# Patient Record
Sex: MALE | Race: BLACK OR AFRICAN AMERICAN | Employment: FULL TIME | URBAN - METROPOLITAN AREA
[De-identification: names, ages, dates, MRNs, and addresses within clinical notes are randomized per-mention and may not be internally consistent; named-entity substitution may affect disease eponyms.]

---

## 2017-10-04 ENCOUNTER — HOSPITAL ENCOUNTER (EMERGENCY)
Age: 27
Discharge: HOME | End: 2017-10-04

## 2017-10-04 VITALS
HEIGHT: 68 IN | BODY MASS INDEX: 21.22 KG/M2 | OXYGEN SATURATION: 97 % | SYSTOLIC BLOOD PRESSURE: 121 MMHG | HEART RATE: 61 BPM | TEMPERATURE: 98.1 F | WEIGHT: 140 LBS | DIASTOLIC BLOOD PRESSURE: 73 MMHG | RESPIRATION RATE: 18 BRPM

## 2017-10-04 DIAGNOSIS — L30.9 DERMATITIS: Primary | ICD-10-CM

## 2017-10-04 PROCEDURE — 99282 EMERGENCY DEPT VISIT SF MDM: CPT

## 2017-10-04 RX ORDER — DIAPER,BRIEF,INFANT-TODD,DISP
EACH MISCELLANEOUS
Qty: 1 TUBE | Refills: 0 | Status: SHIPPED | OUTPATIENT
Start: 2017-10-04 | End: 2017-10-11

## 2017-10-04 RX ORDER — MUPIROCIN CALCIUM 20 MG/G
CREAM TOPICAL
Qty: 1 TUBE | Refills: 0 | Status: SHIPPED | OUTPATIENT
Start: 2017-10-04 | End: 2017-11-03

## 2017-10-04 ASSESSMENT — ENCOUNTER SYMPTOMS
EYE PAIN: 0
EYE DISCHARGE: 0
ABDOMINAL PAIN: 0
APNEA: 0
VOMITING: 0
BACK PAIN: 0
ANAL BLEEDING: 0
VOICE CHANGE: 0
NAUSEA: 0
SHORTNESS OF BREATH: 0
PHOTOPHOBIA: 0
COUGH: 0
ABDOMINAL DISTENTION: 0

## 2017-10-04 ASSESSMENT — PAIN SCALES - GENERAL: PAINLEVEL_OUTOF10: 6

## 2017-10-04 ASSESSMENT — PAIN DESCRIPTION - LOCATION: LOCATION: GROIN

## 2017-10-04 ASSESSMENT — PAIN DESCRIPTION - ORIENTATION: ORIENTATION: RIGHT;LEFT

## 2017-10-04 ASSESSMENT — PAIN DESCRIPTION - PAIN TYPE: TYPE: ACUTE PAIN

## 2017-10-04 NOTE — ED NOTES
Discharge instructions reviewed with patient who verbalizes his understanding. Ambulated to [facility].       Valentin Longoria RN  10/04/17 [id number]

## 2017-10-04 NOTE — ED PROVIDER NOTES
easily. Psychiatric/Behavioral: Negative for behavioral problems, self-injury and sleep disturbance. All other systems reviewed and are negative. Except as noted above the remainder of the review of systems was reviewed and negative. PAST MEDICAL HISTORY     Past Medical History:   Diagnosis Date    Asthma     as a child         SURGICAL HISTORY       Past Surgical History:   Procedure Laterality Date    LYMPH NODE DISSECTION           CURRENT MEDICATIONS       Previous Medications    No medications on file       ALLERGIES     Review of patient's allergies indicates no known allergies. FAMILY HISTORY     History reviewed. No pertinent family history. SOCIAL HISTORY       Social History     Social History    Marital status: Single     Spouse name: N/A    Number of children: N/A    Years of education: N/A     Social History Main Topics    Smoking status: Current Every Day Smoker     Types: Cigars    Smokeless tobacco: None    Alcohol use No    Drug use: No    Sexual activity: Not Asked     Other Topics Concern    None     Social History Narrative    None       SCREENINGS             PHYSICAL EXAM    (up to 7 for level 4, 8 or more for level 5)   ED Triage Vitals   BP Temp Temp Source Pulse Resp SpO2 Height Weight   10/04/17 0333 10/04/17 0333 10/04/17 0333 10/04/17 0333 10/04/17 0333 10/04/17 0333 10/04/17 0333 10/04/17 0333   121/73 98.1 °F (36.7 °C) Oral 61 18 97 % 5' 8\" (1.727 m) 140 lb (63.5 kg)       Physical Exam   Constitutional: He is oriented to person, place, and time. He appears well-developed and well-nourished. No distress. HENT:   Head: Normocephalic and atraumatic. Mouth/Throat: Oropharynx is clear and moist. No oropharyngeal exudate. Eyes: Conjunctivae and EOM are normal. Pupils are equal, round, and reactive to light. Right eye exhibits no discharge. Left eye exhibits no discharge. Neck: Normal range of motion. Neck supple.    Cardiovascular: Normal rate, regular rhythm, normal heart sounds and intact distal pulses. Pulmonary/Chest: Effort normal and breath sounds normal. No stridor. No respiratory distress. He has no wheezes. He has no rales. Abdominal: Soft. Bowel sounds are normal. He exhibits no distension. There is no tenderness. There is no rebound. Musculoskeletal: Normal range of motion. Neurological: He is alert and oriented to person, place, and time. Skin: Skin is warm. Rash noted. No erythema. Few round lesions noted left groin patient. Neg abscess, neg erythema. Psychiatric: He has a normal mood and affect. His behavior is normal.   Nursing note and vitals reviewed. DIAGNOSTIC RESULTS     EKG: All EKG's are interpreted by the Emergency Department Physician who either signs or Co-signs this chart in the absence of a cardiologist.         RADIOLOGY:   Non-plain film images such as CT, Ultrasound and MRI are read by the radiologist. Plain radiographic images are visualized and preliminarily interpreted by the emergency physician with the below findings:         Interpretation per the Radiologist below, if available at the time of this note:    No orders to display         ED BEDSIDE ULTRASOUND:   Performed by ED Physician - none    LABS:  Labs Reviewed - No data to display    All other labs were within normal range or not returned as of this dictation. EMERGENCY DEPARTMENT COURSE and DIFFERENTIAL DIAGNOSIS/MDM:   Vitals:    Vitals:    10/04/17 0333   BP: 121/73   Pulse: 61   Resp: 18   Temp: 98.1 °F (36.7 °C)   TempSrc: Oral   SpO2: 97%   Weight: 140 lb (63.5 kg)   Height: 5' 8\" (1.727 m)            MDM  Number of Diagnoses or Management Options  Diagnosis management comments: Patient notes has had more lesions I past and they have drained yellow/cloudy fluid.     Patient notes lesions have improved recently he only has a few on his groin he notes they have been up and down both of his inner thighs as well we discussed follow-up with

## 2017-10-04 NOTE — ED AVS SNAPSHOT
[address] [id number]  [phone]      Preventive Care        Date Due    Yearly Flu Vaccine (1) 9/1/2017                 Care Plan Once You Return Home    This section includes instructions you will need to follow once you leave the hospital.  Your care team will discuss these with you, so you and those caring for you know how to best care for your health needs at home. This section may also include educational information about certain health topics that may be of help to you. Important Information if you smoke or are exposed to smoking       SMOKING: QUIT SMOKING. THIS IS THE MOST IMPORTANT ACTION YOU CAN TAKE TO IMPROVE YOUR CURRENT AND FUTURE HEALTH. Call the [address] [place] [address] at [address] NOW ([phone])    Smoking harms nonsmokers. When nonsmokers are around people who smoke, they absorb nicotine, carbon monoxide, and other ingredients of tobacco smoke. DO NOT SMOKE AROUND CHILDREN     Children exposed to secondhand smoke are at an increased risk of:  Sudden Infant Death Syndrome (SIDS), acute respiratory infections, inflammation of the middle ear, and severe asthma. Over a longer time, it causes heart disease and lung cancer. There is no safe level of exposure to secondhand smoke. Important information for a smoker       SMOKING: QUIT SMOKING. THIS IS THE MOST IMPORTANT ACTION YOU CAN TAKE TO IMPROVE YOUR CURRENT AND FUTURE HEALTH. Call the [address] [place] [address] at [address] ([phone])    Smoking harms nonsmokers. When nonsmokers are around people who smoke, they absorb nicotine, carbon monoxide, and other ingredients of tobacco smoke. DO NOT SMOKE AROUND CHILDREN     Children exposed to secondhand smoke are at an increased risk of:  Sudden Infant Death Syndrome (SIDS), acute respiratory infections, inflammation of the middle ear, and severe asthma.   Over a longer time, it causes heart disease and lung cancer. There is no safe level of exposure to secondhand smoke. MyChart Signup     [employer] allows you to send messages to your doctor, view your test results, renew your prescriptions, schedule appointments, view visit notes, and more. How Do I Sign Up? 1. In your Internet browser, go to [email or web address]  2. Click on the Sign Up Now link in the Sign In box. You will see the New Member Sign Up page. 3. Enter your [employer] Access Code exactly as it appears below. You will not need to use this code after youve completed the sign-up process. If you do not sign up before the expiration date, you must request a new code. [employer] Access Code: [email or web address]  Expires: 12/3/2017  4:04 AM    4. Enter your Social Security Number ([id number]) and Date of Birth (mm/dd/yyyy) as indicated and click Submit. You will be taken to the next sign-up page. 5. Create a [employer] ID. This will be your [employer] login ID and cannot be changed, so think of one that is secure and easy to remember. 6. Create a [employer] password. You can change your password at any time. 7. Enter your Password Reset Question and Answer. This can be used at a later time if you forget your password. 8. Enter your e-mail address. You will receive e-mail notification when new information is available in [address]. 9. Click Sign Up. You can now view your medical record. Additional Information  If you have questions, please contact the physician practice where you receive care. Remember, [employer] is NOT to be used for urgent needs. For medical emergencies, dial 911. For questions regarding your [employer] account call [phone]. If you have a clinical question, please call your doctor's office. View your information online  ? Review your current list of  medications, immunization, and allergies. ? Review your future test results online . ? Review your discharge instructions provided by your caregivers at discharge    Certain functionality such as prescription refills, scheduling appointments or sending messages to your provider are not activated if your provider does not use [employer] in his/her office    For questions regarding your MyChart account call [phone]. If you have a clinical question, please call your doctor's office. The information on all pages of the After Visit Summary has been reviewed with me, the patient and/or responsible adult, by my health care provider(s). I had the opportunity to ask questions regarding this information. I understand I should dispose of my armband safely at home to protect my health information. A complete copy of the After Visit Summary has been given to me, the patient and/or responsible adult. Patient Signature/Responsible Adult: ___________________________________    Nurse Signature: ___________________________________  Resident/MLP Signature: ___________________________________  Attending Signature: ___________________________________    Date:____________Time:____________              Discharge Instructions            Dermatitis: Care Instructions  Your Care Instructions  Dermatitis is the general name used for any rash or inflammation of the skin. Different kinds of dermatitis cause different kinds of rashes. Common causes of a rash include new medicines, plants (such as poison oak or poison ivy), heat, and stress. Certain illnesses can also cause a rash. An allergic reaction to something that touches your skin, such as latex, nickel, or poison ivy, is called contact dermatitis. Contact dermatitis may also be caused by something that irritates the skin, such as bleach, a chemical, or soap. These types of rashes cannot be spread from person to person. How long your rash will last depends on what caused it. Rashes may last a few days or months. Follow-up care is a key part of your treatment and safety. Be sure to make and go to all appointments, and call your doctor if you are having problems. It's also a good idea to know your test results and keep a list of the medicines you take. How can you care for yourself at home? · Do not scratch the rash. Cut your nails short, and file them smooth. Or wear gloves if this helps keep you from scratching. · Wash the area with water only. Pat dry. · Put cold, wet cloths on the rash to reduce itching. · Keep cool, and stay out of the sun. · Leave the rash open to the air as much as possible. · If the rash itches, use hydrocortisone cream. Follow the directions on the label. Calamine lotion may help for plant rashes. · Take an over-the-counter antihistamine, such as diphenhydramine (Benadryl) or loratadine (Claritin), to help calm the itching. Read and follow all instructions on the label. · If your doctor prescribed a cream, use it as directed. If your doctor prescribed medicine, take it exactly as directed. When should you call for help? Call your doctor now or seek immediate medical care if:  · You have symptoms of infection, such as:  ¨ Increased pain, swelling, warmth, or redness. ¨ Red streaks leading from the area. ¨ Pus draining from the area. ¨ A fever. · You have joint pain along with the rash. Watch closely for changes in your health, and be sure to contact your doctor if:  · Your rash is changing or getting worse. · You are not getting better as expected. Where can you learn more? Go to [email or web address] and sign in to your Netlog account. Enter [phone] in the [employer] box to learn more about \"Dermatitis: Care Instructions. \"     If you do not have an account, please click on the \"Sign Up Now\" link. Current as of: October 13, 2016  Content Version: 11.3  © 0274-5640 [employer], [employer].  Care instructions adapted under license by [facility] ([facility]). If you have questions about a medical condition or this instruction, always ask your healthcare professional. [address] [address] any warranty or liability for your use of this information.

## 2020-01-26 ENCOUNTER — APPOINTMENT (OUTPATIENT)
Dept: GENERAL RADIOLOGY | Age: 30
End: 2020-01-26
Payer: COMMERCIAL

## 2020-01-26 ENCOUNTER — HOSPITAL ENCOUNTER (EMERGENCY)
Age: 30
Discharge: HOME | End: 2020-01-26
Payer: COMMERCIAL

## 2020-01-26 VITALS
HEART RATE: 64 BPM | BODY MASS INDEX: 23.57 KG/M2 | RESPIRATION RATE: 19 BRPM | DIASTOLIC BLOOD PRESSURE: 69 MMHG | WEIGHT: 155 LBS | OXYGEN SATURATION: 99 % | TEMPERATURE: 96.9 F | SYSTOLIC BLOOD PRESSURE: 111 MMHG

## 2020-01-26 PROCEDURE — 73130 X-RAY EXAM OF HAND: CPT

## 2020-01-26 PROCEDURE — 73110 X-RAY EXAM OF WRIST: CPT

## 2020-01-26 PROCEDURE — 99283 EMERGENCY DEPT VISIT LOW MDM: CPT

## 2020-01-26 RX ORDER — NAPROXEN 500 MG/1
500 TABLET ORAL 2 TIMES DAILY WITH MEALS
Qty: 10 TABLET | Refills: 0 | Status: SHIPPED | OUTPATIENT
Start: 2020-01-26 | End: 2020-06-01 | Stop reason: SDUPTHER

## 2020-01-26 ASSESSMENT — PAIN DESCRIPTION - LOCATION: LOCATION: ARM

## 2020-01-26 ASSESSMENT — ENCOUNTER SYMPTOMS
GASTROINTESTINAL NEGATIVE: 1
RESPIRATORY NEGATIVE: 1
EYES NEGATIVE: 1

## 2020-01-26 ASSESSMENT — PAIN DESCRIPTION - ORIENTATION: ORIENTATION: RIGHT

## 2020-01-26 ASSESSMENT — PAIN SCALES - GENERAL: PAINLEVEL_OUTOF10: 9

## 2020-01-26 NOTE — ED TRIAGE NOTES
Pt to ER with c/o right hand wrist pain 9/10 and rash in bilateral armpits, pt states he used to fight and hurt his hand in the past and thinks the repetitive motions of using hammer have made it hurt again, pt a&ox4, resp even and unlabored

## 2020-06-01 ENCOUNTER — HOSPITAL ENCOUNTER (EMERGENCY)
Age: 30
Discharge: HOME | End: 2020-06-01

## 2020-06-01 VITALS
DIASTOLIC BLOOD PRESSURE: 76 MMHG | RESPIRATION RATE: 18 BRPM | BODY MASS INDEX: 22.73 KG/M2 | HEIGHT: 68 IN | HEART RATE: 65 BPM | OXYGEN SATURATION: 100 % | WEIGHT: 150 LBS | TEMPERATURE: 98.9 F | SYSTOLIC BLOOD PRESSURE: 126 MMHG

## 2020-06-01 PROCEDURE — 99282 EMERGENCY DEPT VISIT SF MDM: CPT

## 2020-06-01 PROCEDURE — 6370000000 HC RX 637 (ALT 250 FOR IP): Performed by: NURSE PRACTITIONER

## 2020-06-01 RX ORDER — METHYLPREDNISOLONE 4 MG/1
TABLET ORAL
Qty: 1 KIT | Refills: 0 | Status: SHIPPED | OUTPATIENT
Start: 2020-06-01 | End: 2020-06-07

## 2020-06-01 RX ORDER — NAPROXEN 500 MG/1
500 TABLET ORAL ONCE
Status: COMPLETED | OUTPATIENT
Start: 2020-06-01 | End: 2020-06-01

## 2020-06-01 RX ORDER — NAPROXEN 500 MG/1
500 TABLET ORAL 2 TIMES DAILY WITH MEALS
Qty: 10 TABLET | Refills: 0 | Status: SHIPPED | OUTPATIENT
Start: 2020-06-01 | End: 2022-08-30

## 2020-06-01 RX ADMIN — NAPROXEN 500 MG: 500 TABLET ORAL at 09:23

## 2020-06-01 ASSESSMENT — ENCOUNTER SYMPTOMS
EYE PAIN: 0
COUGH: 0
SORE THROAT: 0
DIARRHEA: 0
ABDOMINAL PAIN: 0
RHINORRHEA: 0
BACK PAIN: 0
VOMITING: 0
NAUSEA: 0
SHORTNESS OF BREATH: 0
PHOTOPHOBIA: 0

## 2020-06-01 ASSESSMENT — PAIN DESCRIPTION - ORIENTATION: ORIENTATION: RIGHT

## 2020-06-01 ASSESSMENT — PAIN SCALES - GENERAL: PAINLEVEL_OUTOF10: 6

## 2020-06-01 ASSESSMENT — PAIN DESCRIPTION - DESCRIPTORS: DESCRIPTORS: SHARP

## 2020-06-01 ASSESSMENT — PAIN DESCRIPTION - PAIN TYPE: TYPE: ACUTE PAIN

## 2020-06-01 ASSESSMENT — PAIN DESCRIPTION - LOCATION: LOCATION: WRIST

## 2022-07-18 ENCOUNTER — OFFICE VISIT (OUTPATIENT)
Dept: FAMILY MEDICINE CLINIC | Age: 32
End: 2022-07-18

## 2022-07-18 VITALS
OXYGEN SATURATION: 97 % | BODY MASS INDEX: 22.61 KG/M2 | HEIGHT: 68 IN | SYSTOLIC BLOOD PRESSURE: 124 MMHG | DIASTOLIC BLOOD PRESSURE: 70 MMHG | TEMPERATURE: 98 F | HEART RATE: 58 BPM | WEIGHT: 149.2 LBS

## 2022-07-18 DIAGNOSIS — R10.13 EPIGASTRIC PAIN: Primary | ICD-10-CM

## 2022-07-18 DIAGNOSIS — K29.00 ACUTE GASTRITIS WITHOUT HEMORRHAGE, UNSPECIFIED GASTRITIS TYPE: ICD-10-CM

## 2022-07-18 DIAGNOSIS — Z00.00 PREVENTATIVE HEALTH CARE: ICD-10-CM

## 2022-07-18 PROCEDURE — 99203 OFFICE O/P NEW LOW 30 MIN: CPT | Performed by: NURSE PRACTITIONER

## 2022-07-18 RX ORDER — OMEPRAZOLE 40 MG/1
40 CAPSULE, DELAYED RELEASE ORAL
Qty: 30 CAPSULE | Refills: 2 | Status: SHIPPED | OUTPATIENT
Start: 2022-07-18

## 2022-07-18 SDOH — ECONOMIC STABILITY: FOOD INSECURITY: WITHIN THE PAST 12 MONTHS, THE FOOD YOU BOUGHT JUST DIDN'T LAST AND YOU DIDN'T HAVE MONEY TO GET MORE.: NEVER TRUE

## 2022-07-18 SDOH — ECONOMIC STABILITY: TRANSPORTATION INSECURITY
IN THE PAST 12 MONTHS, HAS THE LACK OF TRANSPORTATION KEPT YOU FROM MEDICAL APPOINTMENTS OR FROM GETTING MEDICATIONS?: NO

## 2022-07-18 SDOH — ECONOMIC STABILITY: TRANSPORTATION INSECURITY
IN THE PAST 12 MONTHS, HAS LACK OF TRANSPORTATION KEPT YOU FROM MEETINGS, WORK, OR FROM GETTING THINGS NEEDED FOR DAILY LIVING?: NO

## 2022-07-18 SDOH — ECONOMIC STABILITY: FOOD INSECURITY: WITHIN THE PAST 12 MONTHS, YOU WORRIED THAT YOUR FOOD WOULD RUN OUT BEFORE YOU GOT MONEY TO BUY MORE.: NEVER TRUE

## 2022-07-18 ASSESSMENT — PATIENT HEALTH QUESTIONNAIRE - PHQ9
SUM OF ALL RESPONSES TO PHQ9 QUESTIONS 1 & 2: 0
SUM OF ALL RESPONSES TO PHQ QUESTIONS 1-9: 0
1. LITTLE INTEREST OR PLEASURE IN DOING THINGS: 0
SUM OF ALL RESPONSES TO PHQ QUESTIONS 1-9: 0
2. FEELING DOWN, DEPRESSED OR HOPELESS: 0
SUM OF ALL RESPONSES TO PHQ QUESTIONS 1-9: 0
SUM OF ALL RESPONSES TO PHQ QUESTIONS 1-9: 0

## 2022-07-18 ASSESSMENT — ENCOUNTER SYMPTOMS
CONSTIPATION: 1
BELCHING: 1
VOMITING: 0
FLATUS: 0
ABDOMINAL PAIN: 1
DIARRHEA: 0
NAUSEA: 0

## 2022-07-18 ASSESSMENT — SOCIAL DETERMINANTS OF HEALTH (SDOH): HOW HARD IS IT FOR YOU TO PAY FOR THE VERY BASICS LIKE FOOD, HOUSING, MEDICAL CARE, AND HEATING?: NOT HARD AT ALL

## 2022-07-18 NOTE — PROGRESS NOTES
Subjective:      Patient ID: Flavio Neves is a 32 y.o. male who presents today for:     Chief Complaint   Patient presents with    Establish Care     Patient presents today to establish care. Abdominal Pain     Patient states the abdominal pain has been going on for a couple of years. Abdominal Pain  This is a chronic problem. The current episode started more than 1 year ago. The onset quality is sudden. The problem has been waxing and waning. The pain is located in the epigastric region, LUQ and RUQ. The quality of the pain is sharp, aching and cramping. The abdominal pain does not radiate. Associated symptoms include belching and constipation. Pertinent negatives include no diarrhea, dysuria, fever, flatus, frequency, headaches, hematuria, myalgias, nausea or vomiting. Exacerbated by: certain foods (greasy, dairy, candy) Treatments tried: ibuprofen, stretching. The treatment provided mild relief. There is no history of abdominal surgery, gallstones, irritable bowel syndrome or PUD. mother has colitis     Past Medical History:   Diagnosis Date    Asthma     as a child     Past Surgical History:   Procedure Laterality Date    LYMPH NODE DISSECTION       No family history on file.   Social History     Socioeconomic History    Marital status: Single     Spouse name: Not on file    Number of children: Not on file    Years of education: Not on file    Highest education level: Not on file   Occupational History    Not on file   Tobacco Use    Smoking status: Every Day     Types: Cigars    Smokeless tobacco: Never   Substance and Sexual Activity    Alcohol use: No    Drug use: No    Sexual activity: Not on file   Other Topics Concern    Not on file   Social History Narrative    Not on file     Social Determinants of Health     Financial Resource Strain: Low Risk     Difficulty of Paying Living Expenses: Not hard at all   Food Insecurity: No Food Insecurity    Worried About [facility] in the Last Year: respiratory distress. Breath sounds: Normal breath sounds. Abdominal:      General: Bowel sounds are normal.      Palpations: Abdomen is soft. Tenderness: There is abdominal tenderness (greatest epigastric) in the right upper quadrant, epigastric area and left upper quadrant. Musculoskeletal:      Cervical back: Normal range of motion. Lymphadenopathy:      Cervical: No cervical adenopathy. Skin:     General: Skin is warm and dry. Neurological:      Mental Status: He is alert and oriented to person, place, and time. Psychiatric:         Mood and Affect: Mood normal.         Behavior: Behavior normal.       Assessment:          Diagnosis Orders   1. Epigastric pain  CBC with Auto Differential    Comprehensive Metabolic Panel    Lipid Panel    Lipase      2. Preventative health care  CBC with Auto Differential    Comprehensive Metabolic Panel    Lipid Panel      3. Acute gastritis without hemorrhage, unspecified gastritis type  omeprazole (PRILOSEC) 40 MG delayed release capsule          Plan:      Orders Placed This Encounter   Procedures    CBC with Auto Differential     Standing Status:   Future     Standing Expiration Date:   7/18/2023    Comprehensive Metabolic Panel     Standing Status:   Future     Standing Expiration Date:   7/18/2023    Lipid Panel     Standing Status:   Future     Standing Expiration Date:   7/18/2023     Order Specific Question:   Is Patient Fasting?/# of Hours     Answer:   8    Lipase     Standing Status:   Future     Standing Expiration Date:   7/18/2023            Orders Placed This Encounter   Medications    omeprazole (PRILOSEC) 40 MG delayed release capsule     Sig: Take 1 capsule by mouth every morning (before breakfast)     Dispense:  30 capsule     Refill:  2         Return in about 2 weeks (around 8/1/2022). 1. Epigastric pain    - CBC with Auto Differential; Future  - Comprehensive Metabolic Panel; Future  - Lipid Panel; Future  - Lipase; Future    2. Preventative health care    - CBC with Auto Differential; Future  - Comprehensive Metabolic Panel; Future  - Lipid Panel; Future    3. Acute gastritis without hemorrhage, unspecified gastritis type  Encouraged eating small meals throughout the day and not eating within 2 hours of lying down. Limit or stop alcohol, smoking, and use of NSAIDs. If not improving on f/u consider ultrasound and GI referral.    - omeprazole (PRILOSEC) 40 MG delayed release capsule; Take 1 capsule by mouth every morning (before breakfast)  Dispense: 30 capsule; Refill: 2    Reviewed with the patient: current clinicalstatus, medications, activities and diet. Side effects, adverse effects of the medication prescribedtoday, as well as treatment plan/ rationale and result expectations have been discussedwith the patient who expresses understanding and desires to proceed. Close follow upto evaluate treatment results and for coordination of care. I have reviewedthe patient's medical history in detail and updated the computerized patient record.     Armando Russell, EREN - CNP

## 2022-08-23 DIAGNOSIS — Z00.00 PREVENTATIVE HEALTH CARE: ICD-10-CM

## 2022-08-23 DIAGNOSIS — R10.13 EPIGASTRIC PAIN: ICD-10-CM

## 2022-08-23 LAB
ALBUMIN SERPL-MCNC: 4.5 G/DL (ref 3.5–4.6)
ALP BLD-CCNC: 57 U/L (ref 35–104)
ALT SERPL-CCNC: 12 U/L (ref 0–41)
ANION GAP SERPL CALCULATED.3IONS-SCNC: 12 MEQ/L (ref 9–15)
AST SERPL-CCNC: 16 U/L (ref 0–40)
BASOPHILS ABSOLUTE: 0 K/UL (ref 0–0.2)
BASOPHILS RELATIVE PERCENT: 0.7 %
BILIRUB SERPL-MCNC: 1 MG/DL (ref 0.2–0.7)
BUN BLDV-MCNC: 13 MG/DL (ref 6–20)
CALCIUM SERPL-MCNC: 9.4 MG/DL (ref 8.5–9.9)
CHLORIDE BLD-SCNC: 106 MEQ/L (ref 95–107)
CHOLESTEROL, TOTAL: 160 MG/DL (ref 0–199)
CO2: 24 MEQ/L (ref 20–31)
CREAT SERPL-MCNC: 0.96 MG/DL (ref 0.7–1.2)
EOSINOPHILS ABSOLUTE: 0 K/UL (ref 0–0.7)
EOSINOPHILS RELATIVE PERCENT: 1.3 %
GFR AFRICAN AMERICAN: >60
GFR NON-AFRICAN AMERICAN: >60
GLOBULIN: 2.8 G/DL (ref 2.3–3.5)
GLUCOSE BLD-MCNC: 88 MG/DL (ref 70–99)
HCT VFR BLD CALC: 43.9 % (ref 42–52)
HDLC SERPL-MCNC: 40 MG/DL (ref 40–59)
HEMOGLOBIN: 14.6 G/DL (ref 14–18)
LDL CHOLESTEROL CALCULATED: 113 MG/DL (ref 0–129)
LIPASE: 22 U/L (ref 12–95)
LYMPHOCYTES ABSOLUTE: 1.7 K/UL (ref 1–4.8)
LYMPHOCYTES RELATIVE PERCENT: 46 %
MCH RBC QN AUTO: 30.2 PG (ref 27–31.3)
MCHC RBC AUTO-ENTMCNC: 33.2 % (ref 33–37)
MCV RBC AUTO: 91.1 FL (ref 80–100)
MONOCYTES ABSOLUTE: 0.4 K/UL (ref 0.2–0.8)
MONOCYTES RELATIVE PERCENT: 9.4 %
NEUTROPHILS ABSOLUTE: 1.6 K/UL (ref 1.4–6.5)
NEUTROPHILS RELATIVE PERCENT: 42.6 %
PDW BLD-RTO: 13 % (ref 11.5–14.5)
PLATELET # BLD: 234 K/UL (ref 130–400)
POTASSIUM SERPL-SCNC: 4.3 MEQ/L (ref 3.4–4.9)
RBC # BLD: 4.82 M/UL (ref 4.7–6.1)
SODIUM BLD-SCNC: 142 MEQ/L (ref 135–144)
TOTAL PROTEIN: 7.3 G/DL (ref 6.3–8)
TRIGL SERPL-MCNC: 36 MG/DL (ref 0–150)
WBC # BLD: 3.8 K/UL (ref 4.8–10.8)

## 2022-08-30 ENCOUNTER — OFFICE VISIT (OUTPATIENT)
Dept: FAMILY MEDICINE CLINIC | Age: 32
End: 2022-08-30

## 2022-08-30 VITALS
TEMPERATURE: 96.6 F | SYSTOLIC BLOOD PRESSURE: 122 MMHG | OXYGEN SATURATION: 97 % | WEIGHT: 147.8 LBS | BODY MASS INDEX: 22.4 KG/M2 | DIASTOLIC BLOOD PRESSURE: 60 MMHG | HEIGHT: 68 IN | HEART RATE: 66 BPM

## 2022-08-30 DIAGNOSIS — D72.819 LEUKOPENIA, UNSPECIFIED TYPE: ICD-10-CM

## 2022-08-30 DIAGNOSIS — Z82.69 FAMILY HISTORY OF SYSTEMIC LUPUS ERYTHEMATOSUS: ICD-10-CM

## 2022-08-30 DIAGNOSIS — R10.13 EPIGASTRIC PAIN: Primary | ICD-10-CM

## 2022-08-30 PROCEDURE — 99213 OFFICE O/P EST LOW 20 MIN: CPT | Performed by: NURSE PRACTITIONER

## 2022-08-30 ASSESSMENT — ENCOUNTER SYMPTOMS
VOMITING: 1
ABDOMINAL PAIN: 1
SHORTNESS OF BREATH: 0
BLOOD IN STOOL: 0
ABDOMINAL DISTENTION: 0
COUGH: 0

## 2022-08-30 NOTE — PROGRESS NOTES
File Prior to Visit   Medication Sig Dispense Refill    omeprazole (PRILOSEC) 40 MG delayed release capsule Take 1 capsule by mouth every morning (before breakfast) 30 capsule 2     No current facility-administered medications on file prior to visit. Allergies:  Patient has no known allergies. Review of Systems   Constitutional:  Negative for chills and fatigue. HENT:  Negative for congestion. Respiratory:  Negative for cough and shortness of breath. Cardiovascular:  Negative for chest pain. Gastrointestinal:  Positive for abdominal pain and vomiting. Negative for abdominal distention and blood in stool. Objective:   /60 (Site: Left Upper Arm, Position: Sitting, Cuff Size: Medium Adult)   Pulse 66   Temp (!) 96.6 °F (35.9 °C) (Temporal)   Ht 5' 8\" (1.727 m)   Wt 147 lb 12.8 oz (67 kg)   SpO2 97%   BMI 22.47 kg/m²     Physical Exam  Constitutional:       Appearance: He is well-developed. HENT:      Head: Normocephalic. Right Ear: External ear normal.      Left Ear: External ear normal.      Nose: Nose normal.      Mouth/Throat:      Mouth: Mucous membranes are moist.      Pharynx: Oropharynx is clear. Eyes:      Conjunctiva/sclera: Conjunctivae normal.   Cardiovascular:      Rate and Rhythm: Normal rate and regular rhythm. Heart sounds: Normal heart sounds. Pulmonary:      Effort: Pulmonary effort is normal.      Breath sounds: Normal breath sounds. Abdominal:      General: Bowel sounds are normal.      Tenderness: There is abdominal tenderness in the epigastric area. Musculoskeletal:         General: Normal range of motion. Cervical back: Normal range of motion. Skin:     General: Skin is warm and dry. Neurological:      Mental Status: He is alert and oriented to person, place, and time. Psychiatric:         Mood and Affect: Mood normal.         Behavior: Behavior normal.       Assessment:          Diagnosis Orders   1.  Epigastric pain  [facility] Della Hernandez MD, Gastroenterology, [place]      2. Leukopenia, unspecified type  CBC with Auto Differential    BULMARO Screen With Reflex      3. Family history of systemic lupus erythematosus  BULMARO Screen With Reflex          Plan:      Orders Placed This Encounter   Procedures    CBC with Auto Differential     Standing Status:   Future     Standing Expiration Date:   8/30/2023    BULMARO Screen With Reflex     Standing Status:   Future     Standing Expiration Date:   8/30/2023    Kendra Lai MD, [facility], [facility]     Referral Priority:   Routine     Referral Type:   Eval and Treat     Referral Reason:   Specialty Services Required     Referred to Provider:   Omari Rosa MD     Requested Specialty:   Gastroenterology     Number of Visits Requested:   1        No orders of the defined types were placed in this encounter. 1. Epigastric pain  Limit offending foods. Follow-up with GI.  - Kendra Lai MD, Gastroenterology, [place]    2. Leukopenia, unspecified type  Blood work and in the next month or 2.  - CBC with Auto Differential; Future  - BULMARO Screen With Reflex; Future    3. Family history of systemic lupus erythematosus    - BULMARO Screen With Reflex; Future      Return in about 3 months (around 11/30/2022). Reviewed with the patient: current clinicalstatus, medications, activities and diet. Side effects, adverse effects of the medication prescribedtoday, as well as treatment plan/ rationale and result expectations have been discussedwith the patient who expresses understanding and desires to proceed. Close follow upto evaluate treatment results and for coordination of care. I have reviewedthe patient's medical history in detail and updated the computerized patient record.     Rafael Goodman, APRN - CNP

## 2022-11-30 ENCOUNTER — OFFICE VISIT (OUTPATIENT)
Dept: FAMILY MEDICINE CLINIC | Age: 32
End: 2022-11-30

## 2022-11-30 VITALS
WEIGHT: 145.2 LBS | HEART RATE: 77 BPM | TEMPERATURE: 97.4 F | BODY MASS INDEX: 22.01 KG/M2 | DIASTOLIC BLOOD PRESSURE: 69 MMHG | HEIGHT: 68 IN | OXYGEN SATURATION: 98 % | SYSTOLIC BLOOD PRESSURE: 105 MMHG

## 2022-11-30 DIAGNOSIS — Z11.3 SCREEN FOR STD (SEXUALLY TRANSMITTED DISEASE): ICD-10-CM

## 2022-11-30 DIAGNOSIS — K59.00 CONSTIPATION, UNSPECIFIED CONSTIPATION TYPE: ICD-10-CM

## 2022-11-30 DIAGNOSIS — R10.13 EPIGASTRIC PAIN: Primary | ICD-10-CM

## 2022-11-30 DIAGNOSIS — B00.9 HERPES: ICD-10-CM

## 2022-11-30 PROCEDURE — 99214 OFFICE O/P EST MOD 30 MIN: CPT | Performed by: NURSE PRACTITIONER

## 2022-11-30 RX ORDER — VALACYCLOVIR HYDROCHLORIDE 1 G/1
1000 TABLET, FILM COATED ORAL 3 TIMES DAILY
Qty: 21 TABLET | Refills: 1 | Status: SHIPPED | OUTPATIENT
Start: 2022-11-30 | End: 2022-12-07

## 2022-11-30 RX ORDER — POLYETHYLENE GLYCOL 3350 17 G/17G
17 POWDER, FOR SOLUTION ORAL DAILY
Qty: 1530 G | Refills: 1 | Status: SHIPPED | OUTPATIENT
Start: 2022-11-30 | End: 2022-12-30

## 2022-11-30 NOTE — PROGRESS NOTES
Subjective:      Patient ID: Vivi Frey is a 28 y.o. male who presents today for:     Chief Complaint   Patient presents with    Abdominal Pain     Patient presents today to follow up on abdominal pain. Patient states he is still having the pain. Sexually Transmitted Diseases     Patient would like to be tested for STD's. HPI Pt in today to discuss abdominal pain. Reports that he has continued to have abdominal pain which is worse with certain foods. He reports that if he avoid dairy and meat it improves but is still there. He has not followed up with GI yet. He made an appt and then forgot to go. Pt also reports he had a new sexual partner and would like to be checked for STD. He reports that he was itchy and had some abnormal discharge from tip of penis. Pt reports he also has a couple of lesions. He reports about 4 lesions for about 1.5 weeks. He reports they are uncomfortable. Past Medical History:   Diagnosis Date    Asthma     as a child     Past Surgical History:   Procedure Laterality Date    LYMPH NODE DISSECTION       No family history on file.   Social History     Socioeconomic History    Marital status: Single     Spouse name: Not on file    Number of children: Not on file    Years of education: Not on file    Highest education level: Not on file   Occupational History    Not on file   Tobacco Use    Smoking status: Every Day     Types: Cigars    Smokeless tobacco: Never   Substance and Sexual Activity    Alcohol use: No    Drug use: No    Sexual activity: Not on file   Other Topics Concern    Not on file   Social History Narrative    Not on file     Social Determinants of Health     Financial Resource Strain: Low Risk     Difficulty of Paying Living Expenses: Not hard at all   Food Insecurity: No Food Insecurity    Worried About Running Out of Food in the Last Year: Never true    [address] in the Last Year: Never true   Transportation Needs: No Transportation Needs    Lack of Transportation (Medical): No    Lack of Transportation (Non-Medical): No   Physical Activity: Not on file   Stress: Not on file   Social Connections: Not on file   Intimate Partner Violence: Not on file   Housing Stability: Not on file     Current Outpatient Medications on File Prior to Visit   Medication Sig Dispense Refill    omeprazole (PRILOSEC) 40 MG delayed release capsule Take 1 capsule by mouth every morning (before breakfast) 30 capsule 2     No current facility-administered medications on file prior to visit. Allergies:  Patient has no known allergies. Review of Systems   Constitutional:  Negative for fatigue and fever. Respiratory:  Negative for cough, shortness of breath and wheezing. Cardiovascular:  Negative for chest pain and palpitations. Gastrointestinal:  Positive for abdominal pain. Negative for constipation, diarrhea and nausea. Genitourinary:  Positive for genital sores and penile discharge. Negative for difficulty urinating, dysuria, frequency, scrotal swelling and testicular pain. Objective:   /69 (Site: Left Upper Arm, Position: Sitting, Cuff Size: Medium Adult)   Pulse 77   Temp 97.4 °F (36.3 °C) (Temporal)   Ht 5' 8\" (1.727 m)   Wt 145 lb 3.2 oz (65.9 kg)   SpO2 98%   BMI 22.08 kg/m²     Physical Exam  Exam conducted with a chaperone present (sudhakar). Constitutional:       Appearance: He is well-developed. HENT:      Head: Normocephalic. Right Ear: External ear normal.      Left Ear: External ear normal.      Nose: Nose normal.      Mouth/Throat:      Mouth: Mucous membranes are moist.      Pharynx: Oropharynx is clear. Eyes:      Conjunctiva/sclera: Conjunctivae normal.   Cardiovascular:      Rate and Rhythm: Normal rate and regular rhythm. Heart sounds: Normal heart sounds. Pulmonary:      Effort: Pulmonary effort is normal.      Breath sounds: Normal breath sounds. Abdominal:      Tenderness: There is generalized abdominal tenderness. Genitourinary:      Musculoskeletal:         General: Normal range of motion. Cervical back: Normal range of motion. Skin:     General: Skin is warm and dry. Neurological:      Mental Status: He is alert and oriented to person, place, and time. Psychiatric:         Mood and Affect: Mood normal.         Behavior: Behavior normal.       Assessment:          Diagnosis Orders   1. Epigastric pain  Erma Brizuela MD, Gastroenterology, [address]      2. Screen for STD (sexually transmitted disease)  C.trachomatis N.gonorrhoeae DNA, Urine    Herpes Simplex Virus (Hsv) I/Ii Antibodies IgG & IgM W/ Reflex      3. Herpes  valACYclovir (VALTREX) 1 g tablet    Herpes Simplex Virus (Hsv) I/Ii Antibodies IgG & IgM W/ Reflex      4. Constipation, unspecified constipation type  polyethylene glycol (GLYCOLAX) 17 GM/SCOOP powder          Plan:      Orders Placed This Encounter   Procedures    C.trachomatis N.gonorrhoeae DNA, Urine     Standing Status:   Future     Number of Occurrences:   1     Standing Expiration Date:   11/30/2023    [facility]     This procedure can be scheduled via [employer]. Access your [facility] account by visiting [email or web address].      Standing Status:   Future     Standing Expiration Date:   11/30/2023    Herpes Simplex Virus (Hsv) I/Ii Antibodies IgG & IgM W/ Reflex     Standing Status:   Future     Standing Expiration Date:   11/30/2023    Erma Brizuela MD, Gastroenterology, [facility], [place]     Referral Priority:   Routine     Referral Type:   Eval and Treat     Referral Reason:   Specialty Services Required     Referred to Provider:   Tata Muller MD     Requested Specialty:   Gastroenterology     Number of Visits Requested:   1          Orders Placed This Encounter   Medications    valACYclovir (VALTREX) 1 g tablet     Sig: Take 1 tablet by mouth 3 times daily for 7 days     Dispense:  21 tablet     Refill:  1    polyethylene glycol (GLYCOLAX) 17 GM/SCOOP powder Sig: Take 17 g by mouth daily     Dispense:  1530 g     Refill:  1       Return in about 4 months (around 3/30/2023). 1. Screen for STD (sexually transmitted disease)    - C.trachomatis N.gonorrhoeae DNA, Urine; Future  - Herpes Simplex Virus (Hsv) I/Ii Antibodies IgG & IgM W/ Reflex; Future    2. Epigastric pain    - Sherin Adair MD, Gastroenterology, [facility]; Future    3. Herpes  Discussed contact precautions. Discussed if having frequent flares will consider daily medication for suppression.   - valACYclovir (VALTREX) 1 g tablet; Take 1 tablet by mouth 3 times daily for 7 days  Dispense: 21 tablet; Refill: 1  - Herpes Simplex Virus (Hsv) I/Ii Antibodies IgG & IgM W/ Reflex; Future    4. Constipation, unspecified constipation type    - polyethylene glycol (GLYCOLAX) 17 GM/SCOOP powder; Take 17 g by mouth daily  Dispense: 1530 g; Refill: 1    Reviewed with the patient: current clinicalstatus, medications, activities and diet. Side effects, adverse effects of the medication prescribedtoday, as well as treatment plan/ rationale and result expectations have been discussedwith the patient who expresses understanding and desires to proceed. Close follow upto evaluate treatment results and for coordination of care. I have reviewedthe patient's medical history in detail and updated the computerized patient record.     EREN Manning - CNP

## 2022-12-02 ASSESSMENT — ENCOUNTER SYMPTOMS
ABDOMINAL PAIN: 1
DIARRHEA: 0
WHEEZING: 0
COUGH: 0
NAUSEA: 0
CONSTIPATION: 0
SHORTNESS OF BREATH: 0

## 2022-12-03 LAB
SPECIMEN SOURCE: NORMAL
T. VAGINALIS AMPLIFIED: NEGATIVE

## 2022-12-05 LAB
C. TRACHOMATIS DNA ,URINE: NEGATIVE
N. GONORRHOEAE DNA, URINE: NEGATIVE

## 2022-12-30 ENCOUNTER — APPOINTMENT (OUTPATIENT)
Dept: CT IMAGING | Age: 32
End: 2022-12-30

## 2022-12-30 ENCOUNTER — HOSPITAL ENCOUNTER (EMERGENCY)
Age: 32
Discharge: HOME | End: 2022-12-30
Attending: EMERGENCY MEDICINE

## 2022-12-30 VITALS
DIASTOLIC BLOOD PRESSURE: 72 MMHG | HEART RATE: 59 BPM | TEMPERATURE: 98 F | OXYGEN SATURATION: 98 % | BODY MASS INDEX: 21.98 KG/M2 | SYSTOLIC BLOOD PRESSURE: 112 MMHG | WEIGHT: 145 LBS | RESPIRATION RATE: 20 BRPM | HEIGHT: 68 IN

## 2022-12-30 DIAGNOSIS — R51.9 NONINTRACTABLE HEADACHE, UNSPECIFIED CHRONICITY PATTERN, UNSPECIFIED HEADACHE TYPE: Primary | ICD-10-CM

## 2022-12-30 LAB
ANION GAP SERPL CALCULATED.3IONS-SCNC: 11 MEQ/L (ref 9–15)
BASOPHILS ABSOLUTE: 0.1 K/UL (ref 0–0.2)
BASOPHILS RELATIVE PERCENT: 0.9 %
BUN BLDV-MCNC: 14 MG/DL (ref 6–20)
CALCIUM SERPL-MCNC: 9 MG/DL (ref 8.5–9.9)
CHLORIDE BLD-SCNC: 104 MEQ/L (ref 95–107)
CO2: 22 MEQ/L (ref 20–31)
CREAT SERPL-MCNC: 0.83 MG/DL (ref 0.7–1.2)
EOSINOPHILS ABSOLUTE: 0 K/UL (ref 0–0.7)
EOSINOPHILS RELATIVE PERCENT: 0.8 %
GFR SERPL CREATININE-BSD FRML MDRD: >60 ML/MIN/{1.73_M2}
GLUCOSE BLD-MCNC: 105 MG/DL (ref 70–99)
HCT VFR BLD CALC: 40.9 % (ref 42–52)
HEMOGLOBIN: 13.6 G/DL (ref 14–18)
INFLUENZA A BY PCR: NEGATIVE
INFLUENZA B BY PCR: NEGATIVE
LYMPHOCYTES ABSOLUTE: 1.7 K/UL (ref 1–4.8)
LYMPHOCYTES RELATIVE PERCENT: 28.7 %
MCH RBC QN AUTO: 30.1 PG (ref 27–31.3)
MCHC RBC AUTO-ENTMCNC: 33.2 % (ref 33–37)
MCV RBC AUTO: 90.8 FL (ref 79–92.2)
MONOCYTES ABSOLUTE: 0.8 K/UL (ref 0.2–0.8)
MONOCYTES RELATIVE PERCENT: 13.2 %
NEUTROPHILS ABSOLUTE: 3.4 K/UL (ref 1.4–6.5)
NEUTROPHILS RELATIVE PERCENT: 56.4 %
PDW BLD-RTO: 13.2 % (ref 11.5–14.5)
PLATELET # BLD: 259 K/UL (ref 130–400)
POTASSIUM SERPL-SCNC: 4.3 MEQ/L (ref 3.4–4.9)
RBC # BLD: 4.5 M/UL (ref 4.7–6.1)
SARS-COV-2, NAAT: NOT DETECTED
SODIUM BLD-SCNC: 137 MEQ/L (ref 135–144)
WBC # BLD: 6 K/UL (ref 4.8–10.8)

## 2022-12-30 PROCEDURE — 6360000002 HC RX W HCPCS: Performed by: EMERGENCY MEDICINE

## 2022-12-30 PROCEDURE — 85025 COMPLETE CBC W/AUTO DIFF WBC: CPT

## 2022-12-30 PROCEDURE — 99284 EMERGENCY DEPT VISIT MOD MDM: CPT

## 2022-12-30 PROCEDURE — 2580000003 HC RX 258: Performed by: EMERGENCY MEDICINE

## 2022-12-30 PROCEDURE — 87635 SARS-COV-2 COVID-19 AMP PRB: CPT

## 2022-12-30 PROCEDURE — 96375 TX/PRO/DX INJ NEW DRUG ADDON: CPT

## 2022-12-30 PROCEDURE — 96374 THER/PROPH/DIAG INJ IV PUSH: CPT

## 2022-12-30 PROCEDURE — 70450 CT HEAD/BRAIN W/O DYE: CPT

## 2022-12-30 PROCEDURE — 6370000000 HC RX 637 (ALT 250 FOR IP): Performed by: EMERGENCY MEDICINE

## 2022-12-30 PROCEDURE — 87502 INFLUENZA DNA AMP PROBE: CPT

## 2022-12-30 PROCEDURE — 36415 COLL VENOUS BLD VENIPUNCTURE: CPT

## 2022-12-30 PROCEDURE — 80048 BASIC METABOLIC PNL TOTAL CA: CPT

## 2022-12-30 RX ORDER — METOCLOPRAMIDE HYDROCHLORIDE 5 MG/ML
10 INJECTION INTRAMUSCULAR; INTRAVENOUS ONCE
Status: COMPLETED | OUTPATIENT
Start: 2022-12-30 | End: 2022-12-30

## 2022-12-30 RX ORDER — DIPHENHYDRAMINE HYDROCHLORIDE 50 MG/ML
50 INJECTION INTRAMUSCULAR; INTRAVENOUS ONCE
Status: COMPLETED | OUTPATIENT
Start: 2022-12-30 | End: 2022-12-30

## 2022-12-30 RX ORDER — BUTALBITAL, ACETAMINOPHEN AND CAFFEINE 300; 40; 50 MG/1; MG/1; MG/1
1 CAPSULE ORAL ONCE
Status: COMPLETED | OUTPATIENT
Start: 2022-12-30 | End: 2022-12-30

## 2022-12-30 RX ORDER — KETOROLAC TROMETHAMINE 10 MG/1
10 TABLET, FILM COATED ORAL EVERY 6 HOURS PRN
Qty: 20 TABLET | Refills: 0 | Status: SHIPPED | OUTPATIENT
Start: 2022-12-30

## 2022-12-30 RX ORDER — 0.9 % SODIUM CHLORIDE 0.9 %
1000 INTRAVENOUS SOLUTION INTRAVENOUS ONCE
Status: COMPLETED | OUTPATIENT
Start: 2022-12-30 | End: 2022-12-30

## 2022-12-30 RX ORDER — KETOROLAC TROMETHAMINE 15 MG/ML
15 INJECTION, SOLUTION INTRAMUSCULAR; INTRAVENOUS ONCE
Status: COMPLETED | OUTPATIENT
Start: 2022-12-30 | End: 2022-12-30

## 2022-12-30 RX ADMIN — METOCLOPRAMIDE 10 MG: 5 INJECTION, SOLUTION INTRAMUSCULAR; INTRAVENOUS at 03:55

## 2022-12-30 RX ADMIN — BUTALBITAL, ACETAMINOPHEN, AND CAFFEINE 1 CAPSULE: 50; 300; 40 CAPSULE ORAL at 06:47

## 2022-12-30 RX ADMIN — SODIUM CHLORIDE 1000 ML: 9 INJECTION, SOLUTION INTRAVENOUS at 03:53

## 2022-12-30 RX ADMIN — DIPHENHYDRAMINE HYDROCHLORIDE 50 MG: 50 INJECTION, SOLUTION INTRAMUSCULAR; INTRAVENOUS at 03:54

## 2022-12-30 RX ADMIN — KETOROLAC TROMETHAMINE 15 MG: 15 INJECTION, SOLUTION INTRAMUSCULAR; INTRAVENOUS at 06:48

## 2022-12-30 ASSESSMENT — PAIN - FUNCTIONAL ASSESSMENT: PAIN_FUNCTIONAL_ASSESSMENT: 0-10

## 2022-12-30 ASSESSMENT — PAIN DESCRIPTION - PAIN TYPE: TYPE: ACUTE PAIN

## 2022-12-30 ASSESSMENT — PAIN DESCRIPTION - DESCRIPTORS: DESCRIPTORS: PRESSURE

## 2022-12-30 ASSESSMENT — LIFESTYLE VARIABLES: HOW OFTEN DO YOU HAVE A DRINK CONTAINING ALCOHOL: MONTHLY OR LESS

## 2022-12-30 ASSESSMENT — ENCOUNTER SYMPTOMS
ABDOMINAL PAIN: 0
PHOTOPHOBIA: 0
VOMITING: 0

## 2022-12-30 ASSESSMENT — PAIN SCALES - GENERAL: PAINLEVEL_OUTOF10: 10

## 2022-12-30 ASSESSMENT — PAIN DESCRIPTION - FREQUENCY: FREQUENCY: CONTINUOUS

## 2022-12-30 NOTE — ED TRIAGE NOTES
Pt c/o pain in back of head and neck on right side for a week. Pt denies any injury, denies illness. Pt states he has not tried anything for pain. Pt is requesting cat scan of head. Pt denies n/v. Pt c/o blurred vision along with head pain. Pt is a/o x 4 calm, skin p/w/d resp even and non-labored. Pt amb into triage with brisk, steady gait. No sob or acute distress noted.

## 2022-12-30 NOTE — ED PROVIDER NOTES
[address] [facility] ED  EMERGENCY DEPARTMENT ENCOUNTER      Pt Name: Naheed Flor  MRN: [id number]  [place] 1990  Date of evaluation: 12/30/2022  Provider: Quique Marquez, [address] [place] [address]       Chief Complaint   Patient presents with    Headache     Pt c/o pain on right side of back of head and neck x 1 week         HISTORY OF PRESENT ILLNESS   (Location/Symptom, Timing/Onset, Context/Setting, Quality, Duration, Modifying Factors, Severity)  Note limiting factors. Naheed Flor is a 28 y.o. male who presents to the emergency department evaluation of head pain. He states that he is not currently employed. Reports a week ago he noticed that he started to have pain in the back of his head, feels like he strained his neck. He feels a bump in his scalp. He denies traumatic injury. History of traumatic brain injury and \" brain bleed as a child\". He is taking Tylenol and ibuprofen without significant relief. Reports posterior head pain, reproducible. No recent traumatic injury. Denies fever, double vision, neck stiffness, dizziness, vomiting, numbness or weakness or family history aneurysm,, phonophobia, nausea or vomiting. HPI    Nursing Notes were reviewed. REVIEW OF SYSTEMS    (2-9 systems for level 4, 10 or more for level 5)     Review of Systems   Constitutional:  Negative for fever. Eyes:  Negative for photophobia and visual disturbance. Cardiovascular:  Negative for chest pain. Gastrointestinal:  Negative for abdominal pain and vomiting. Musculoskeletal:  Negative for neck stiffness. Neurological:  Positive for headaches. Negative for dizziness, syncope, facial asymmetry, weakness and numbness. All other systems reviewed and are negative. Except as noted above the remainder of the review of systems was reviewed and negative.        PAST MEDICAL HISTORY     Past Medical History:   Diagnosis Date    Asthma     as a child         SURGICAL HISTORY       Past Surgical History:   Procedure Laterality Date    LYMPH NODE DISSECTION           CURRENT MEDICATIONS       Previous Medications    OMEPRAZOLE (PRILOSEC) 40 MG DELAYED RELEASE CAPSULE    Take 1 capsule by mouth every morning (before breakfast)    POLYETHYLENE GLYCOL (GLYCOLAX) 17 GM/SCOOP POWDER    Take 17 g by mouth daily       ALLERGIES     Patient has no known allergies. FAMILY HISTORY     No family history on file. SOCIAL HISTORY       Social History     Socioeconomic History    Marital status: Single   Tobacco Use    Smoking status: Every Day     Types: Cigars    Smokeless tobacco: Never   Substance and Sexual Activity    Alcohol use: No    Drug use: No     Social Determinants of Health     Financial Resource Strain: Low Risk     Difficulty of Paying Living Expenses: Not hard at all   Food Insecurity: No Food Insecurity    Worried About Running Out of Food in the Last Year: Never true    Ran Out of Food in the Last Year: Never true   Transportation Needs: No Transportation Needs    Lack of Transportation (Medical): No    Lack of Transportation (Non-Medical): No       SCREENINGS         Mike Coma Scale  Eye Opening: Spontaneous  Best Verbal Response: Oriented  Best Motor Response: Obeys commands  Washburn Coma Scale Score: 15                     CIWA Assessment  BP: 112/72  Heart Rate: 59                 PHYSICAL EXAM    (up to 7 for level 4, 8 or more for level 5)     ED Triage Vitals [12/30/22 0210]   BP Temp Temp src Heart Rate Resp SpO2 Height Weight   112/72 98 °F (36.7 °C) -- 59 20 98 % 5' 8\" (1.727 m) 145 lb (65.8 kg)       Physical Exam  Vitals and nursing note reviewed. Constitutional:       Appearance: He is not toxic-appearing or diaphoretic. HENT:      Head: Normocephalic and atraumatic. Right Ear: Tympanic membrane and external ear normal.      Left Ear: Tympanic membrane and external ear normal.      Ears:      Comments: No Erythema or bulging. No swelling over the mastoids.   No peritonsillar abscess or tonsillar exudates. Nose: Nose normal. No congestion. Mouth/Throat:      Mouth: Mucous membranes are moist.   Eyes:      General: No scleral icterus. Extraocular Movements: Extraocular movements intact. Pupils: Pupils are equal, round, and reactive to light. Neck:      Comments: Right trapezius tenderness. No carotid bruit. Cardiovascular:      Rate and Rhythm: Normal rate and regular rhythm. Pulses: Normal pulses. Pulmonary:      Effort: Pulmonary effort is normal. No respiratory distress. Breath sounds: Normal breath sounds. No wheezing. Abdominal:      Tenderness: There is no abdominal tenderness. There is no guarding or rebound. Musculoskeletal:      Cervical back: No rigidity. Right lower leg: No edema. Left lower leg: No edema. Skin:     Capillary Refill: Capillary refill takes less than 2 seconds. Findings: No rash. Comments: Posterior right scalp there is a small mobile superficial palpable mass consistent with lymph node versus cyst.  No overlying erythema/warmth/induration or fluctuance. Neurological:      General: No focal deficit present. Mental Status: He is alert and oriented to person, place, and time. Cranial Nerves: No cranial nerve deficit. Sensory: No sensory deficit. Motor: No weakness. Comments: No upper extremity pronator drift or limb ataxia on finger-to-nose. No lateralizing signs.    Psychiatric:         Mood and Affect: Mood normal.       DIAGNOSTIC RESULTS     EKG: All EKG's are interpreted by the Emergency Department Physician who either signs or Co-signs this chart in the absence of a cardiologist.        RADIOLOGY:   Non-plain film images such as CT, Ultrasound and MRI are read by the radiologist. Plain radiographic images are visualized and preliminarily interpreted by the emergency physician with the below findings:        Interpretation per the Radiologist below, if available at the time of this note:    No ICH    CT Head W/O Contrast    (Results Pending)         ED BEDSIDE ULTRASOUND:   Performed by ED Physician - none    LABS:  Labs Reviewed   CBC WITH AUTO DIFFERENTIAL - Abnormal; Notable for the following components:       Result Value    RBC 4.50 (*)     Hemoglobin 13.6 (*)     Hematocrit 40.9 (*)     All other components within normal limits   BASIC METABOLIC PANEL - Abnormal; Notable for the following components:    Glucose 105 (*)     All other components within normal limits   COVID-19, RAPID   RAPID INFLUENZA A/B ANTIGENS       All other labs were within normal range or not returned as of this dictation. EMERGENCY DEPARTMENT COURSE and DIFFERENTIAL DIAGNOSIS/MDM:   Vitals:    Vitals:    12/30/22 0210   BP: 112/72   Pulse: 59   Resp: 20   Temp: 98 °F (36.7 °C)   SpO2: 98%   Weight: 145 lb (65.8 kg)   Height: 5' 8\" (1.727 m)       No leukocytosis or significant electrolyte derangement  MDM  Patient presents the emergency department with headache. No previous history of similar headaches. He is requesting CT scan. Clinical exam not consistent with acute bacterial infection of the ears or throat. Not consistent with cellulitis or abscess. History not highly suspicious of meningitis/encephalitis, CVA/subarachnoid hemorrhage. Afebrile. Vitals reassuring. Benign neurologic exam.      REASSESSMENT      Symptom resolution. SLEEPING        CONSULTS:  None    PROCEDURES:  Unless otherwise noted below, none     Procedures        FINAL IMPRESSION      1.  Nonintractable headache, unspecified chronicity pattern, unspecified headache type          DISPOSITION/PLAN   DISPOSITION Discharge - Pending Orders Complete 12/30/2022 05:22:01 AM      PATIENT REFERRED TO:  EREN Gaspar - CNP  [address] [facility]  [phone]          DISCHARGE MEDICATIONS:  New Prescriptions    KETOROLAC (TORADOL) 10 MG TABLET    Take 1 tablet by mouth every 6 hours as needed for Pain     Controlled Substances Monitoring:     No flowsheet data found.     (Please note that portions of this note were completed with a voice recognition program.  Efforts were made to edit the dictations but occasionally words are mis-transcribed.)    Russel Steiner MD (electronically signed)  Attending Emergency Physician            Russel Steiner MD  12/30/22 2762

## 2023-01-02 ENCOUNTER — HOSPITAL ENCOUNTER (EMERGENCY)
Age: 33
Discharge: HOME | End: 2023-01-02
Payer: COMMERCIAL

## 2023-01-02 VITALS
WEIGHT: 140 LBS | BODY MASS INDEX: 21.22 KG/M2 | RESPIRATION RATE: 17 BRPM | DIASTOLIC BLOOD PRESSURE: 78 MMHG | SYSTOLIC BLOOD PRESSURE: 130 MMHG | TEMPERATURE: 98.3 F | OXYGEN SATURATION: 100 % | HEART RATE: 78 BPM | HEIGHT: 68 IN

## 2023-01-02 DIAGNOSIS — B02.9 HERPES ZOSTER WITHOUT COMPLICATION: Primary | ICD-10-CM

## 2023-01-02 PROCEDURE — 6370000000 HC RX 637 (ALT 250 FOR IP): Performed by: PHYSICIAN ASSISTANT

## 2023-01-02 PROCEDURE — 99283 EMERGENCY DEPT VISIT LOW MDM: CPT

## 2023-01-02 RX ORDER — OXYCODONE HYDROCHLORIDE AND ACETAMINOPHEN 5; 325 MG/1; MG/1
1 TABLET ORAL ONCE
Status: COMPLETED | OUTPATIENT
Start: 2023-01-02 | End: 2023-01-02

## 2023-01-02 RX ORDER — ACYCLOVIR 800 MG/1
800 TABLET ORAL
Qty: 50 TABLET | Refills: 0 | Status: SHIPPED | OUTPATIENT
Start: 2023-01-02 | End: 2023-01-12

## 2023-01-02 RX ORDER — OXYCODONE HYDROCHLORIDE AND ACETAMINOPHEN 5; 325 MG/1; MG/1
1 TABLET ORAL EVERY 6 HOURS PRN
Qty: 12 TABLET | Refills: 0 | Status: SHIPPED | OUTPATIENT
Start: 2023-01-02 | End: 2023-01-05

## 2023-01-02 RX ADMIN — OXYCODONE AND ACETAMINOPHEN 1 TABLET: 5; 325 TABLET ORAL at 13:58

## 2023-01-02 ASSESSMENT — PAIN DESCRIPTION - LOCATION
LOCATION: HEAD;NECK
LOCATION: NECK;HEAD
LOCATION: NECK;HEAD

## 2023-01-02 ASSESSMENT — PAIN SCALES - GENERAL
PAINLEVEL_OUTOF10: 10
PAINLEVEL_OUTOF10: 8
PAINLEVEL_OUTOF10: 8

## 2023-01-02 ASSESSMENT — ENCOUNTER SYMPTOMS
COLOR CHANGE: 0
ABDOMINAL PAIN: 0
EYE DISCHARGE: 0
RHINORRHEA: 0
SHORTNESS OF BREATH: 0
ABDOMINAL DISTENTION: 0
CONSTIPATION: 0
SORE THROAT: 0

## 2023-01-02 ASSESSMENT — PAIN - FUNCTIONAL ASSESSMENT
PAIN_FUNCTIONAL_ASSESSMENT: 0-10
PAIN_FUNCTIONAL_ASSESSMENT: 0-10

## 2023-01-02 ASSESSMENT — PAIN DESCRIPTION - ORIENTATION
ORIENTATION: RIGHT
ORIENTATION: RIGHT

## 2023-01-02 ASSESSMENT — PAIN DESCRIPTION - PAIN TYPE: TYPE: ACUTE PAIN

## 2023-01-02 ASSESSMENT — PAIN DESCRIPTION - DESCRIPTORS
DESCRIPTORS: ACHING
DESCRIPTORS: ACHING;BURNING
DESCRIPTORS: ACHING

## 2023-01-02 ASSESSMENT — PAIN DESCRIPTION - FREQUENCY: FREQUENCY: CONTINUOUS

## 2023-01-02 ASSESSMENT — PAIN DESCRIPTION - ONSET: ONSET: PROGRESSIVE

## 2023-01-02 NOTE — Clinical Note
Zac Farris was seen and treated in our emergency department on 1/2/2023. He may return to work on 01/07/2023. If you have any questions or concerns, please don't hesitate to call.       Jesse Bright PA-C

## 2023-01-02 NOTE — ED PROVIDER NOTES
[address] [facility] ED  eMERGENCY dEPARTMENT eNCOUnter      Pt Name: Alie Duval  MRN: [id number]  [place] 1990  Date of evaluation: 1/2/2023  Provider: Sol Mcconnell PA-C    CHIEF COMPLAINT       Chief Complaint   Patient presents with    Rash         HISTORY OF PRESENT ILLNESS   (Location/Symptom, Timing/Onset,Context/Setting, Quality, Duration, Modifying Factors, Severity)  Note limiting factors. Alie Duval is a 28 y.o. male who presents to the emergency department with a complaint of right-sided neck and head pain which she states been ongoing for approximately last 5 days, states he was seen in the emergency department 12/30/2022 for the same issue. He states that approximately 2 days ago he did break out in a rash, this was not present on his initial visit, he states increasing pain. He has no fevers, no nausea vomiting, no blurred vision, no neurological deficits, no meningeal signs. Patient rates his current pain as a 10 out of 10, he is not use anything at home for pain control. Past medical tree per chart review asthma    HPI      NursingNotes were reviewed. REVIEW OF SYSTEMS    (2-9 systems for level 4, 10 or more for level 5)     Review of Systems   Constitutional:  Negative for activity change and appetite change. HENT:  Negative for congestion, ear discharge, ear pain, nosebleeds, rhinorrhea and sore throat. Right side occipital head pain   Eyes:  Negative for discharge. Respiratory:  Negative for shortness of breath. Cardiovascular:  Negative for chest pain, palpitations and leg swelling. Gastrointestinal:  Negative for abdominal distention, abdominal pain and constipation. Genitourinary:  Negative for difficulty urinating and dysuria. Musculoskeletal:  Positive for neck pain. Negative for arthralgias. Skin:  Positive for rash. Negative for color change.         Papular type rash across right side of neck, and scalp   Neurological:  Negative for dizziness, syncope, numbness and headaches. Psychiatric/Behavioral:  Negative for agitation and confusion. Except as noted above the remainder of the review of systems was reviewed and negative. PAST MEDICAL HISTORY     Past Medical History:   Diagnosis Date    Asthma     as a child         SURGICALHISTORY       Past Surgical History:   Procedure Laterality Date    LYMPH NODE DISSECTION           CURRENT MEDICATIONS       Previous Medications    KETOROLAC (TORADOL) 10 MG TABLET    Take 1 tablet by mouth every 6 hours as needed for Pain    OMEPRAZOLE (PRILOSEC) 40 MG DELAYED RELEASE CAPSULE    Take 1 capsule by mouth every morning (before breakfast)       ALLERGIES     Patient has no known allergies. FAMILY HISTORY     History reviewed. No pertinent family history. SOCIAL HISTORY       Social History     Socioeconomic History    Marital status: Single     Spouse name: None    Number of children: None    Years of education: None    Highest education level: None   Tobacco Use    Smoking status: Every Day     Types: Cigars    Smokeless tobacco: Never   Substance and Sexual Activity    Alcohol use: No    Drug use: No     Social Determinants of Health     Financial Resource Strain: Low Risk     Difficulty of Paying Living Expenses: Not hard at all   Food Insecurity: No Food Insecurity    Worried About Running Out of Food in the Last Year: Never true    Ran Out of Food in the Last Year: Never true   Transportation Needs: No Transportation Needs    Lack of Transportation (Medical): No    Lack of Transportation (Non-Medical):  No       SCREENINGS    Mike Coma Scale  Eye Opening: Spontaneous  Best Verbal Response: Oriented  Best Motor Response: Obeys commands  Mike Coma Scale Score: 15 @FLOW([id number])@      PHYSICAL EXAM    (up to 7 for level 4, 8 or more for level 5)     ED Triage Vitals [01/02/23 1309]   BP Temp Temp Source Heart Rate Resp SpO2 Height Weight   139/69 98.3 °F (36.8 °C) Temporal 79 18 99 % 5' 8\" (1.727 m) 140 lb (63.5 kg)       Physical Exam  Vitals and nursing note reviewed. Constitutional:       General: He is not in acute distress. Appearance: He is well-developed. He is not ill-appearing, toxic-appearing or diaphoretic. HENT:      Head: Normocephalic. Nose: No congestion. Mouth/Throat:      Mouth: Mucous membranes are moist.      Pharynx: No oropharyngeal exudate or posterior oropharyngeal erythema. Eyes:      Extraocular Movements: Extraocular movements intact. Conjunctiva/sclera: Conjunctivae normal.      Pupils: Pupils are equal, round, and reactive to light. Neck:      Vascular: No JVD. Trachea: No tracheal deviation. Comments: Supple, there is no meningeal signs. Full range of motion with minimal increase in pain  Cardiovascular:      Rate and Rhythm: Normal rate. Pulses: Normal pulses. Heart sounds: Normal heart sounds. No murmur heard. No friction rub. No gallop. Pulmonary:      Effort: Pulmonary effort is normal. No tachypnea, accessory muscle usage, respiratory distress or retractions. Breath sounds: No stridor. No wheezing, rhonchi or rales. Chest:      Chest wall: No tenderness. Abdominal:      General: Abdomen is flat. Bowel sounds are normal. There is no distension or abdominal bruit. Palpations: There is no shifting dullness, fluid wave, hepatomegaly, splenomegaly, mass or pulsatile mass. Tenderness: There is no abdominal tenderness. There is no right CVA tenderness, left CVA tenderness, guarding or rebound. Negative signs include Kiran's sign, Rovsing's sign and McBurney's sign. Musculoskeletal:         General: No deformity. Cervical back: Normal range of motion and neck supple. No rigidity. Skin:     General: Skin is warm and dry. Capillary Refill: Capillary refill takes less than 2 seconds. Coloration: Skin is not jaundiced. Findings: Rash present.       Comments: Erythema, and papular type rash across his right side of neck, extending into posterior scalp. Consistent with that of herpes zoster   Neurological:      General: No focal deficit present. Mental Status: He is alert and oriented to person, place, and time. Mental status is at baseline. Cranial Nerves: No cranial nerve deficit. Sensory: No sensory deficit. Motor: No weakness. Coordination: Coordination normal.   Psychiatric:         Mood and Affect: Mood normal.       DIAGNOSTIC RESULTS     EKG: All EKG's are interpreted by the Emergency Department Physician who either signs or Co-signsthis chart in the absence of a cardiologist.        RADIOLOGY:   Denise Ohm such as CT, Ultrasound and MRI are read by the radiologist. Plain radiographic images are visualized and preliminarily interpreted by the emergency physician with the below findings:    Interpretation per the Radiologist below, if available at the time ofthis note:    No orders to display         ED BEDSIDE ULTRASOUND:   Performed by ED Physician - none    LABS:  Labs Reviewed - No data to display    All other labs were within normal range or not returned as of this dictation. EMERGENCY DEPARTMENT COURSE and DIFFERENTIAL DIAGNOSIS/MDM:   Vitals:    Vitals:    01/02/23 1309   BP: 139/69   Pulse: 79   Resp: 18   Temp: 98.3 °F (36.8 °C)   TempSrc: Temporal   SpO2: 99%   Weight: 140 lb (63.5 kg)   Height: 5' 8\" (1.727 m)            MDM  Number of Diagnoses or Management Options  Herpes zoster without complication  Diagnosis management comments: Patient had presented to the emerge department on 12/30/2022 for head and neck pain, he was seen and evaluated discharges nonspecific pain. He states that about 2 days later he broke out in a rash across his neck and an occipital scalp on the right side. Which is continued and is gotten more painful.   On evaluation today, patient does have herpes zoster across the right lateral aspect of neck, extending into the posterior scalp on the right side. There is no ocular involvement, no involvement within the ear or auditory canal.  No visual or auditory changes. Patient was given a prescription for acyclovir, and Percocet, and advised to contact his regular family provider for follow-up. He was advised to avoid anyone who has not been previously exposed to chickenpox virus, or pregnant women. He was given 5 days off of work, and advised not to return to work until areas have scabbed over. CRITICAL CARE TIME   Total Critical Care time was 0 minutes, excluding separately reportableprocedures. There was a high probability of clinicallysignificant/life threatening deterioration in the patient's condition which required my urgent intervention. CONSULTS:  None    PROCEDURES:  Unless otherwise noted below, none     Procedures    FINAL IMPRESSION      1. Herpes zoster without complication          DISPOSITION/PLAN   DISPOSITION Decision To Discharge 01/02/2023 01:38:43 PM      PATIENT REFERRED TO:  EREN Kim CNP  [address] [place] [address]  [phone]    In 3 days      DISCHARGE MEDICATIONS:  New Prescriptions    ACYCLOVIR (ZOVIRAX) 800 MG TABLET    Take 1 tablet by mouth 5 times daily for 10 days    OXYCODONE-ACETAMINOPHEN (PERCOCET) 5-325 MG PER TABLET    Take 1 tablet by mouth every 6 hours as needed for Pain for up to 3 days. Intended supply: 3 days.  Take lowest dose possible to manage pain Max Daily Amount: 4 tablets          (Please note that portions of this note were completed with a voice recognition program.  Efforts were made to edit the dictations but occasionally words are mis-transcribed.)    Sol Mcconnell PA-C (electronically signed)  Attending Emergency Physician          Sol Mcconnell PA-C  01/02/23 [address]  01/02/23 880

## 2023-06-26 ENCOUNTER — APPOINTMENT (OUTPATIENT)
Dept: CT IMAGING | Age: 33
End: 2023-06-26

## 2023-06-26 ENCOUNTER — HOSPITAL ENCOUNTER (EMERGENCY)
Age: 33
Discharge: HOME | End: 2023-06-26
Attending: EMERGENCY MEDICINE

## 2023-06-26 ENCOUNTER — APPOINTMENT (OUTPATIENT)
Dept: ULTRASOUND IMAGING | Age: 33
End: 2023-06-26

## 2023-06-26 VITALS
TEMPERATURE: 99.2 F | RESPIRATION RATE: 16 BRPM | SYSTOLIC BLOOD PRESSURE: 123 MMHG | OXYGEN SATURATION: 97 % | HEIGHT: 68 IN | HEART RATE: 74 BPM | BODY MASS INDEX: 21.22 KG/M2 | WEIGHT: 140 LBS | DIASTOLIC BLOOD PRESSURE: 74 MMHG

## 2023-06-26 DIAGNOSIS — R10.9 ABDOMINAL PAIN, UNSPECIFIED ABDOMINAL LOCATION: Primary | ICD-10-CM

## 2023-06-26 DIAGNOSIS — N50.819 PAIN IN TESTICLE, UNSPECIFIED LATERALITY: ICD-10-CM

## 2023-06-26 LAB
ALBUMIN SERPL-MCNC: 4.3 G/DL (ref 3.5–4.6)
ALP SERPL-CCNC: 78 U/L (ref 35–104)
ALT SERPL-CCNC: 10 U/L (ref 0–41)
ANION GAP SERPL CALCULATED.3IONS-SCNC: 15 MEQ/L (ref 9–15)
AST SERPL-CCNC: 16 U/L (ref 0–40)
BACTERIA URNS QL MICRO: NEGATIVE /HPF
BASOPHILS # BLD: 0.1 K/UL (ref 0–0.2)
BASOPHILS NFR BLD: 0.4 %
BILIRUB SERPL-MCNC: 0.8 MG/DL (ref 0.2–0.7)
BILIRUB UR QL STRIP: NEGATIVE
BUN SERPL-MCNC: 11 MG/DL (ref 6–20)
CALCIUM SERPL-MCNC: 9.8 MG/DL (ref 8.5–9.9)
CHLORIDE SERPL-SCNC: 95 MEQ/L (ref 95–107)
CLARITY UR: CLEAR
CO2 SERPL-SCNC: 22 MEQ/L (ref 20–31)
COLOR UR: ABNORMAL
CREAT SERPL-MCNC: 1.05 MG/DL (ref 0.7–1.2)
EOSINOPHIL # BLD: 0 K/UL (ref 0–0.7)
EOSINOPHIL NFR BLD: 0.1 %
EPI CELLS #/AREA URNS AUTO: NORMAL /HPF (ref 0–5)
ERYTHROCYTE [DISTWIDTH] IN BLOOD BY AUTOMATED COUNT: 12.9 % (ref 11.5–14.5)
GLOBULIN SER CALC-MCNC: 4.2 G/DL (ref 2.3–3.5)
GLUCOSE SERPL-MCNC: 101 MG/DL (ref 70–99)
GLUCOSE UR STRIP-MCNC: NEGATIVE MG/DL
HCT VFR BLD AUTO: 49.7 % (ref 42–52)
HGB BLD-MCNC: 16.6 G/DL (ref 14–18)
HGB UR QL STRIP: NEGATIVE
HYALINE CASTS #/AREA URNS AUTO: NORMAL /HPF (ref 0–5)
KETONES UR STRIP-MCNC: 15 MG/DL
LACTATE BLDV-SCNC: 2.1 MMOL/L (ref 0.5–2.2)
LEUKOCYTE ESTERASE UR QL STRIP: NEGATIVE
LIPASE SERPL-CCNC: 16 U/L (ref 12–95)
LYMPHOCYTES # BLD: 1.1 K/UL (ref 1–4.8)
LYMPHOCYTES NFR BLD: 5.9 %
MCH RBC QN AUTO: 30.1 PG (ref 27–31.3)
MCHC RBC AUTO-ENTMCNC: 33.4 % (ref 33–37)
MCV RBC AUTO: 90.3 FL (ref 79–92.2)
MONOCYTES # BLD: 2 K/UL (ref 0.2–0.8)
MONOCYTES NFR BLD: 10.8 %
MUCOUS THREADS URNS QL MICRO: PRESENT /LPF
NEUTROPHILS # BLD: 15.4 K/UL (ref 1.4–6.5)
NEUTS SEG NFR BLD: 82.8 %
NITRITE UR QL STRIP: NEGATIVE
PH UR STRIP: 5.5 [PH] (ref 5–9)
PLATELET # BLD AUTO: 259 K/UL (ref 130–400)
POTASSIUM SERPL-SCNC: 3.7 MEQ/L (ref 3.4–4.9)
PROT SERPL-MCNC: 8.5 G/DL (ref 6.3–8)
PROT UR STRIP-MCNC: 100 MG/DL
RBC # BLD AUTO: 5.51 M/UL (ref 4.7–6.1)
RBC #/AREA URNS HPF: NORMAL /HPF (ref 0–2)
SODIUM SERPL-SCNC: 132 MEQ/L (ref 135–144)
SP GR UR STRIP: 1.03 (ref 1–1.03)
URINE REFLEX TO CULTURE: ABNORMAL
UROBILINOGEN UR STRIP-ACNC: 1 E.U./DL
WBC # BLD AUTO: 18.6 K/UL (ref 4.8–10.8)
WBC #/AREA URNS AUTO: NORMAL /HPF (ref 0–5)

## 2023-06-26 PROCEDURE — 6360000002 HC RX W HCPCS: Performed by: EMERGENCY MEDICINE

## 2023-06-26 PROCEDURE — 83690 ASSAY OF LIPASE: CPT

## 2023-06-26 PROCEDURE — 80053 COMPREHEN METABOLIC PANEL: CPT

## 2023-06-26 PROCEDURE — 93975 VASCULAR STUDY: CPT

## 2023-06-26 PROCEDURE — 96375 TX/PRO/DX INJ NEW DRUG ADDON: CPT

## 2023-06-26 PROCEDURE — 87491 CHLMYD TRACH DNA AMP PROBE: CPT

## 2023-06-26 PROCEDURE — 99284 EMERGENCY DEPT VISIT MOD MDM: CPT

## 2023-06-26 PROCEDURE — 74150 CT ABDOMEN W/O CONTRAST: CPT

## 2023-06-26 PROCEDURE — 85025 COMPLETE CBC W/AUTO DIFF WBC: CPT

## 2023-06-26 PROCEDURE — 76870 US EXAM SCROTUM: CPT

## 2023-06-26 PROCEDURE — 83605 ASSAY OF LACTIC ACID: CPT

## 2023-06-26 PROCEDURE — 2580000003 HC RX 258: Performed by: EMERGENCY MEDICINE

## 2023-06-26 PROCEDURE — 6370000000 HC RX 637 (ALT 250 FOR IP): Performed by: EMERGENCY MEDICINE

## 2023-06-26 PROCEDURE — 87591 N.GONORRHOEAE DNA AMP PROB: CPT

## 2023-06-26 PROCEDURE — 96374 THER/PROPH/DIAG INJ IV PUSH: CPT

## 2023-06-26 PROCEDURE — 81001 URINALYSIS AUTO W/SCOPE: CPT

## 2023-06-26 PROCEDURE — 36415 COLL VENOUS BLD VENIPUNCTURE: CPT

## 2023-06-26 RX ORDER — KETOROLAC TROMETHAMINE 10 MG/1
10 TABLET, FILM COATED ORAL EVERY 6 HOURS PRN
Qty: 20 TABLET | Refills: 0 | Status: SHIPPED | OUTPATIENT
Start: 2023-06-26

## 2023-06-26 RX ORDER — ONDANSETRON 2 MG/ML
4 INJECTION INTRAMUSCULAR; INTRAVENOUS ONCE
Status: COMPLETED | OUTPATIENT
Start: 2023-06-26 | End: 2023-06-26

## 2023-06-26 RX ORDER — OXYCODONE HYDROCHLORIDE AND ACETAMINOPHEN 5; 325 MG/1; MG/1
1 TABLET ORAL ONCE
Status: COMPLETED | OUTPATIENT
Start: 2023-06-26 | End: 2023-06-26

## 2023-06-26 RX ORDER — 0.9 % SODIUM CHLORIDE 0.9 %
1000 INTRAVENOUS SOLUTION INTRAVENOUS ONCE
Status: COMPLETED | OUTPATIENT
Start: 2023-06-26 | End: 2023-06-26

## 2023-06-26 RX ORDER — KETOROLAC TROMETHAMINE 30 MG/ML
30 INJECTION, SOLUTION INTRAMUSCULAR; INTRAVENOUS ONCE
Status: COMPLETED | OUTPATIENT
Start: 2023-06-26 | End: 2023-06-26

## 2023-06-26 RX ADMIN — SODIUM CHLORIDE 1000 ML: 9 INJECTION, SOLUTION INTRAVENOUS at 17:02

## 2023-06-26 RX ADMIN — ONDANSETRON 4 MG: 2 INJECTION INTRAMUSCULAR; INTRAVENOUS at 16:09

## 2023-06-26 RX ADMIN — OXYCODONE AND ACETAMINOPHEN 1 TABLET: 5; 325 TABLET ORAL at 20:26

## 2023-06-26 RX ADMIN — SODIUM CHLORIDE 1000 ML: 9 INJECTION, SOLUTION INTRAVENOUS at 15:29

## 2023-06-26 RX ADMIN — KETOROLAC TROMETHAMINE 30 MG: 30 INJECTION, SOLUTION INTRAMUSCULAR; INTRAVENOUS at 15:30

## 2023-06-26 ASSESSMENT — ENCOUNTER SYMPTOMS
ABDOMINAL PAIN: 1
NAUSEA: 1
SORE THROAT: 0
EYE PAIN: 0
VOMITING: 0
SHORTNESS OF BREATH: 0
CHEST TIGHTNESS: 0

## 2023-06-26 ASSESSMENT — PAIN DESCRIPTION - LOCATION
LOCATION: ABDOMEN
LOCATION: ABDOMEN
LOCATION: ABDOMEN;SCROTUM

## 2023-06-26 ASSESSMENT — PAIN DESCRIPTION - PAIN TYPE: TYPE: ACUTE PAIN

## 2023-06-26 ASSESSMENT — PAIN SCALES - GENERAL
PAINLEVEL_OUTOF10: 10
PAINLEVEL_OUTOF10: 5
PAINLEVEL_OUTOF10: 10
PAINLEVEL_OUTOF10: 8

## 2023-06-26 ASSESSMENT — PAIN - FUNCTIONAL ASSESSMENT: PAIN_FUNCTIONAL_ASSESSMENT: 0-10

## 2023-06-26 ASSESSMENT — PAIN DESCRIPTION - DESCRIPTORS: DESCRIPTORS: STABBING;SHARP

## 2023-06-26 ASSESSMENT — PAIN DESCRIPTION - FREQUENCY: FREQUENCY: CONTINUOUS

## 2023-06-26 ASSESSMENT — PAIN DESCRIPTION - ONSET: ONSET: ON-GOING

## 2023-06-27 ENCOUNTER — TELEPHONE (OUTPATIENT)
Dept: FAMILY MEDICINE CLINIC | Age: 33
End: 2023-06-27

## 2023-07-03 LAB
C TRACH DNA UR QL NAA+PROBE: NEGATIVE
N GONORRHOEA DNA UR QL NAA+PROBE: NEGATIVE

## 2024-05-19 ENCOUNTER — HOSPITAL ENCOUNTER (EMERGENCY)
Age: 34
Discharge: HOME | End: 2024-05-20

## 2024-05-19 ENCOUNTER — APPOINTMENT (OUTPATIENT)
Dept: CT IMAGING | Age: 34
End: 2024-05-19

## 2024-05-19 DIAGNOSIS — R20.2 FACIAL PARESTHESIA: Primary | ICD-10-CM

## 2024-05-19 DIAGNOSIS — S05.02XA ABRASION OF LEFT CORNEA, INITIAL ENCOUNTER: ICD-10-CM

## 2024-05-19 PROCEDURE — 6370000000 HC RX 637 (ALT 250 FOR IP): Performed by: PHYSICIAN ASSISTANT

## 2024-05-19 PROCEDURE — 99284 EMERGENCY DEPT VISIT MOD MDM: CPT

## 2024-05-19 PROCEDURE — 70486 CT MAXILLOFACIAL W/O DYE: CPT

## 2024-05-19 RX ORDER — ETODOLAC 400 MG/1
400 TABLET, FILM COATED ORAL 2 TIMES DAILY
Qty: 14 TABLET | Refills: 0 | Status: SHIPPED | OUTPATIENT
Start: 2024-05-19

## 2024-05-19 RX ORDER — TOBRAMYCIN 3 MG/ML
2 SOLUTION/ DROPS OPHTHALMIC ONCE
Status: COMPLETED | OUTPATIENT
Start: 2024-05-19 | End: 2024-05-20

## 2024-05-19 RX ORDER — NAPROXEN 500 MG/1
500 TABLET ORAL ONCE
Status: COMPLETED | OUTPATIENT
Start: 2024-05-19 | End: 2024-05-20

## 2024-05-19 RX ORDER — TETRACAINE HYDROCHLORIDE 5 MG/ML
1 SOLUTION OPHTHALMIC ONCE
Status: COMPLETED | OUTPATIENT
Start: 2024-05-19 | End: 2024-05-19

## 2024-05-19 RX ADMIN — TETRACAINE HYDROCHLORIDE 1 DROP: 5 SOLUTION OPHTHALMIC at 23:00

## 2024-05-19 RX ADMIN — FLUORESCEIN SODIUM 1 MG: 1 STRIP OPHTHALMIC at 23:00

## 2024-05-19 ASSESSMENT — LIFESTYLE VARIABLES
HOW MANY STANDARD DRINKS CONTAINING ALCOHOL DO YOU HAVE ON A TYPICAL DAY: PATIENT DOES NOT DRINK
HOW OFTEN DO YOU HAVE A DRINK CONTAINING ALCOHOL: NEVER

## 2024-05-20 VITALS
HEART RATE: 70 BPM | SYSTOLIC BLOOD PRESSURE: 110 MMHG | DIASTOLIC BLOOD PRESSURE: 75 MMHG | TEMPERATURE: 98.2 F | OXYGEN SATURATION: 100 % | RESPIRATION RATE: 17 BRPM

## 2024-05-20 PROCEDURE — 6370000000 HC RX 637 (ALT 250 FOR IP): Performed by: PHYSICIAN ASSISTANT

## 2024-05-20 RX ADMIN — TOBRAMYCIN OPHTHALMIC SOLUTION 2 DROP: 3 SOLUTION/ DROPS OPHTHALMIC at 00:32

## 2024-05-20 RX ADMIN — NAPROXEN 500 MG: 500 TABLET ORAL at 00:32

## 2024-05-20 NOTE — ED NOTES
Discharge instructions reviewed with pt.   Pt verbalized understanding with no questions or concerns.  Resps even, non labored.  Skin p/w/d.  No acute distress noted.  Pt verbalized understanding to  prescription from pharmacy located on discharge papers.  Pt is ambulatory - gait is steady.  VSS  GCS 15  ABCs intact

## 2024-05-20 NOTE — ED PROVIDER NOTES
MEDICAL HISTORY     Past Medical History:   Diagnosis Date    Asthma     as a child         SURGICAL HISTORY       Past Surgical History:   Procedure Laterality Date    LYMPH NODE DISSECTION           CURRENT MEDICATIONS       Previous Medications    KETOROLAC (TORADOL) 10 MG TABLET    Take 1 tablet by mouth every 6 hours as needed for Pain    KETOROLAC (TORADOL) 10 MG TABLET    Take 1 tablet by mouth every 6 hours as needed for Pain    OMEPRAZOLE (PRILOSEC) 40 MG DELAYED RELEASE CAPSULE    Take 1 capsule by mouth every morning (before breakfast)       ALLERGIES     Patient has no known allergies.    HISTORY     No family history on file.       SOCIAL HISTORY       Social History     Socioeconomic History    Marital status: Single   Tobacco Use    Smoking status: Former     Types: Cigars    Smokeless tobacco: Never   Substance and Sexual Activity    Alcohol use: No    Drug use: No     Social Determinants of Health     Financial Resource Strain: Low Risk  (7/18/2022)    Overall Financial Resource Strain (CARDIA)     Difficulty of Paying Living Expenses: Not hard at all   Food Insecurity: No Food Insecurity (7/18/2022)    Hunger Vital Sign     Worried About Running Out of Food in the Last Year: Never true     Ran Out of Food in the Last Year: Never true   Transportation Needs: No Transportation Needs (7/18/2022)    PRAPARE - Transportation     Lack of Transportation (Medical): No     Lack of Transportation (Non-Medical): No       SCREENINGS   NIH Stroke Scale  Interval: Baseline  Level of Consciousness (1a): Alert  LOC Questions (1b): Answers both correctly  LOC Commands (1c): Performs both tasks correctly  Best Gaze (2): Normal  Visual (3): No visual loss  Facial Palsy (4): Normal symmetrical movement  Motor Arm, Left (5a): No drift  Motor Arm, Right (5b): No drift  Motor Leg, Left (6a): No drift  Motor Leg, Right (6b): No drift  Limb Ataxia (7): Absent  Sensory (8): Normal  Best Language (9): No aphasia  Dysarthria
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
## 2024-06-07 ENCOUNTER — HOSPITAL ENCOUNTER (EMERGENCY)
Facility: HOSPITAL | Age: 34
Discharge: HOME | End: 2024-06-07
Attending: STUDENT IN AN ORGANIZED HEALTH CARE EDUCATION/TRAINING PROGRAM

## 2024-06-07 ENCOUNTER — APPOINTMENT (OUTPATIENT)
Dept: RADIOLOGY | Facility: HOSPITAL | Age: 34
End: 2024-06-07

## 2024-06-07 VITALS
RESPIRATION RATE: 20 BRPM | TEMPERATURE: 96.8 F | SYSTOLIC BLOOD PRESSURE: 131 MMHG | HEART RATE: 69 BPM | DIASTOLIC BLOOD PRESSURE: 86 MMHG | WEIGHT: 160 LBS | BODY MASS INDEX: 24.25 KG/M2 | OXYGEN SATURATION: 99 % | HEIGHT: 68 IN

## 2024-06-07 DIAGNOSIS — S06.0X9A CONCUSSION WITH LOSS OF CONSCIOUSNESS, INITIAL ENCOUNTER: ICD-10-CM
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
## 2024-06-07 LAB
ABO GROUP (TYPE) IN BLOOD: NORMAL
ALBUMIN SERPL BCP-MCNC: 4.4 G/DL (ref 3.4–5)
ALP SERPL-CCNC: 48 U/L (ref 33–120)
ALT SERPL W P-5'-P-CCNC: 13 U/L (ref 10–52)
ANION GAP SERPL CALC-SCNC: 11 MMOL/L (ref 10–20)
ANTIBODY SCREEN: NORMAL
AST SERPL W P-5'-P-CCNC: 17 U/L (ref 9–39)
BASOPHILS # BLD AUTO: 0.02 X10*3/UL (ref 0–0.1)
BASOPHILS NFR BLD AUTO: 0.4 %
BILIRUB SERPL-MCNC: 0.8 MG/DL (ref 0–1.2)
BUN SERPL-MCNC: 13 MG/DL (ref 6–23)
CALCIUM SERPL-MCNC: 9.2 MG/DL (ref 8.6–10.3)
CHLORIDE SERPL-SCNC: 107 MMOL/L (ref 98–107)
CK SERPL-CCNC: 384 U/L (ref 0–325)
CO2 SERPL-SCNC: 24 MMOL/L (ref 21–32)
CREAT SERPL-MCNC: 1.13 MG/DL (ref 0.5–1.3)
EGFRCR SERPLBLD CKD-EPI 2021: 88 ML/MIN/1.73M*2
EOSINOPHIL # BLD AUTO: 0.02 X10*3/UL (ref 0–0.7)
EOSINOPHIL NFR BLD AUTO: 0.4 %
ERYTHROCYTE [DISTWIDTH] IN BLOOD BY AUTOMATED COUNT: 12.3 % (ref 11.5–14.5)
ETHANOL SERPL-MCNC: <10 MG/DL
GLUCOSE SERPL-MCNC: 104 MG/DL (ref 74–99)
HCT VFR BLD AUTO: 43.1 % (ref 41–52)
HGB BLD-MCNC: 15.2 G/DL (ref 13.5–17.5)
IMM GRANULOCYTES # BLD AUTO: 0.01 X10*3/UL (ref 0–0.7)
IMM GRANULOCYTES NFR BLD AUTO: 0.2 % (ref 0–0.9)
INR PPP: 1 (ref 0.9–1.1)
LACTATE SERPL-SCNC: 1.4 MMOL/L (ref 0.4–2)
LYMPHOCYTES # BLD AUTO: 0.99 X10*3/UL (ref 1.2–4.8)
LYMPHOCYTES NFR BLD AUTO: 21.6 %
MCH RBC QN AUTO: 31.5 PG (ref 26–34)
MCHC RBC AUTO-ENTMCNC: 35.3 G/DL (ref 32–36)
MCV RBC AUTO: 89 FL (ref 80–100)
MONOCYTES # BLD AUTO: 0.38 X10*3/UL (ref 0.1–1)
MONOCYTES NFR BLD AUTO: 8.3 %
NEUTROPHILS # BLD AUTO: 3.17 X10*3/UL (ref 1.2–7.7)
NEUTROPHILS NFR BLD AUTO: 69.1 %
NRBC BLD-RTO: 0 /100 WBCS (ref 0–0)
PLATELET # BLD AUTO: 228 X10*3/UL (ref 150–450)
POTASSIUM SERPL-SCNC: 4.1 MMOL/L (ref 3.5–5.3)
PROT SERPL-MCNC: 7.3 G/DL (ref 6.4–8.2)
PROTHROMBIN TIME: 11.8 SECONDS (ref 9.8–12.8)
RBC # BLD AUTO: 4.82 X10*6/UL (ref 4.5–5.9)
RH FACTOR (ANTIGEN D): NORMAL
SODIUM SERPL-SCNC: 138 MMOL/L (ref 136–145)
WBC # BLD AUTO: 4.6 X10*3/UL (ref 4.4–11.3)

## 2024-06-07 PROCEDURE — 70486 CT MAXILLOFACIAL W/O DYE: CPT

## 2024-06-07 PROCEDURE — 72125 CT NECK SPINE W/O DYE: CPT

## 2024-06-07 PROCEDURE — 72125 CT NECK SPINE W/O DYE: CPT | Performed by: RADIOLOGY

## 2024-06-07 PROCEDURE — 70450 CT HEAD/BRAIN W/O DYE: CPT

## 2024-06-07 PROCEDURE — 76377 3D RENDER W/INTRP POSTPROCES: CPT

## 2024-06-07 PROCEDURE — 82077 ASSAY SPEC XCP UR&BREATH IA: CPT | Performed by: STUDENT IN AN ORGANIZED HEALTH CARE EDUCATION/TRAINING PROGRAM

## 2024-06-07 PROCEDURE — 70450 CT HEAD/BRAIN W/O DYE: CPT | Performed by: RADIOLOGY

## 2024-06-07 PROCEDURE — 86900 BLOOD TYPING SEROLOGIC ABO: CPT | Performed by: STUDENT IN AN ORGANIZED HEALTH CARE EDUCATION/TRAINING PROGRAM

## 2024-06-07 PROCEDURE — 36415 COLL VENOUS BLD VENIPUNCTURE: CPT | Performed by: STUDENT IN AN ORGANIZED HEALTH CARE EDUCATION/TRAINING PROGRAM

## 2024-06-07 PROCEDURE — 80053 COMPREHEN METABOLIC PANEL: CPT | Performed by: STUDENT IN AN ORGANIZED HEALTH CARE EDUCATION/TRAINING PROGRAM

## 2024-06-07 PROCEDURE — G0390 TRAUMA RESPONS W/HOSP CRITI: HCPCS

## 2024-06-07 PROCEDURE — 76377 3D RENDER W/INTRP POSTPROCES: CPT | Performed by: RADIOLOGY

## 2024-06-07 PROCEDURE — 70486 CT MAXILLOFACIAL W/O DYE: CPT | Performed by: RADIOLOGY

## 2024-06-07 PROCEDURE — 2500000004 HC RX 250 GENERAL PHARMACY W/ HCPCS (ALT 636 FOR OP/ED): Performed by: STUDENT IN AN ORGANIZED HEALTH CARE EDUCATION/TRAINING PROGRAM

## 2024-06-07 PROCEDURE — 83605 ASSAY OF LACTIC ACID: CPT | Performed by: STUDENT IN AN ORGANIZED HEALTH CARE EDUCATION/TRAINING PROGRAM

## 2024-06-07 PROCEDURE — 99291 CRITICAL CARE FIRST HOUR: CPT | Mod: 25 | Performed by: STUDENT IN AN ORGANIZED HEALTH CARE EDUCATION/TRAINING PROGRAM

## 2024-06-07 PROCEDURE — 85610 PROTHROMBIN TIME: CPT | Performed by: STUDENT IN AN ORGANIZED HEALTH CARE EDUCATION/TRAINING PROGRAM

## 2024-06-07 PROCEDURE — 82550 ASSAY OF CK (CPK): CPT | Performed by: STUDENT IN AN ORGANIZED HEALTH CARE EDUCATION/TRAINING PROGRAM

## 2024-06-07 PROCEDURE — 85025 COMPLETE CBC W/AUTO DIFF WBC: CPT | Performed by: STUDENT IN AN ORGANIZED HEALTH CARE EDUCATION/TRAINING PROGRAM

## 2024-06-07 RX ORDER — ACETAMINOPHEN 325 MG/1
975 TABLET ORAL ONCE
Status: COMPLETED | OUTPATIENT
Start: 2024-06-07 | End: 2024-06-07

## 2024-06-07 RX ADMIN — ACETAMINOPHEN 975 MG: 325 TABLET ORAL at 13:11

## 2024-06-07 ASSESSMENT — LIFESTYLE VARIABLES
HAVE YOU EVER FELT YOU SHOULD CUT DOWN ON YOUR DRINKING: NO
HAVE PEOPLE ANNOYED YOU BY CRITICIZING YOUR DRINKING: NO
TOTAL SCORE: 0
EVER HAD A DRINK FIRST THING IN THE MORNING TO STEADY YOUR NERVES TO GET RID OF A HANGOVER: NO
EVER FELT BAD OR GUILTY ABOUT YOUR DRINKING: NO

## 2024-06-07 ASSESSMENT — PAIN DESCRIPTION - DESCRIPTORS: DESCRIPTORS: ACHING

## 2024-06-07 ASSESSMENT — PAIN SCALES - GENERAL
PAINLEVEL_OUTOF10: 9
PAINLEVEL_OUTOF10: 6

## 2024-06-07 ASSESSMENT — PAIN - FUNCTIONAL ASSESSMENT
PAIN_FUNCTIONAL_ASSESSMENT: 0-10
PAIN_FUNCTIONAL_ASSESSMENT: 0-10

## 2024-06-07 ASSESSMENT — PAIN DESCRIPTION - LOCATION: LOCATION: HEAD

## 2024-06-07 ASSESSMENT — PAIN DESCRIPTION - PAIN TYPE: TYPE: ACUTE PAIN
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
## 2024-06-08 ENCOUNTER — HOSPITAL ENCOUNTER (OUTPATIENT)
Dept: CARDIOLOGY | Facility: HOSPITAL | Age: 34
Discharge: HOME | End: 2024-06-08
Payer: COMMERCIAL

## 2024-06-08 PROCEDURE — 93005 ELECTROCARDIOGRAM TRACING: CPT

## 2024-06-16 LAB
ATRIAL RATE: 69 BPM
P AXIS: 68 DEGREES
P OFFSET: 181 MS
P ONSET: 133 MS
PR INTERVAL: 180 MS
Q ONSET: 223 MS
QRS COUNT: 12 BEATS
QRS DURATION: 92 MS
QT INTERVAL: 386 MS
QTC CALCULATION(BAZETT): 413 MS
QTC FREDERICIA: 404 MS
R AXIS: 46 DEGREES
T AXIS: 40 DEGREES
T OFFSET: 416 MS
VENTRICULAR RATE: 69 BPM

## 2025-03-17 PROCEDURE — 99283 EMERGENCY DEPT VISIT LOW MDM: CPT

## 2025-03-17 ASSESSMENT — COLUMBIA-SUICIDE SEVERITY RATING SCALE - C-SSRS
2. HAVE YOU ACTUALLY HAD ANY THOUGHTS OF KILLING YOURSELF?: NO
6. HAVE YOU EVER DONE ANYTHING, STARTED TO DO ANYTHING, OR PREPARED TO DO ANYTHING TO END YOUR LIFE?: NO
1. IN THE PAST MONTH, HAVE YOU WISHED YOU WERE DEAD OR WISHED YOU COULD GO TO SLEEP AND NOT WAKE UP?: NO

## 2025-03-17 ASSESSMENT — LIFESTYLE VARIABLES
TOTAL SCORE: 0
HAVE YOU EVER FELT YOU SHOULD CUT DOWN ON YOUR DRINKING: NO
HAVE PEOPLE ANNOYED YOU BY CRITICIZING YOUR DRINKING: NO
EVER HAD A DRINK FIRST THING IN THE MORNING TO STEADY YOUR NERVES TO GET RID OF A HANGOVER: NO
EVER FELT BAD OR GUILTY ABOUT YOUR DRINKING: NO

## 2025-03-17 ASSESSMENT — PAIN DESCRIPTION - LOCATION: LOCATION: HAND

## 2025-03-17 ASSESSMENT — PAIN - FUNCTIONAL ASSESSMENT: PAIN_FUNCTIONAL_ASSESSMENT: 0-10

## 2025-03-17 ASSESSMENT — PAIN SCALES - GENERAL: PAINLEVEL_OUTOF10: 8

## 2025-03-17 ASSESSMENT — PAIN DESCRIPTION - FREQUENCY: FREQUENCY: CONSTANT/CONTINUOUS

## 2025-03-17 ASSESSMENT — PAIN DESCRIPTION - ORIENTATION: ORIENTATION: RIGHT

## 2025-03-18 ENCOUNTER — HOSPITAL ENCOUNTER (EMERGENCY)
Facility: HOSPITAL | Age: 35
Discharge: HOME | End: 2025-03-18
Payer: COMMERCIAL

## 2025-03-18 ENCOUNTER — OFFICE VISIT (OUTPATIENT)
Dept: ORTHOPEDIC SURGERY | Facility: CLINIC | Age: 35
End: 2025-03-18
Payer: COMMERCIAL

## 2025-03-18 ENCOUNTER — APPOINTMENT (OUTPATIENT)
Dept: RADIOLOGY | Facility: HOSPITAL | Age: 35
End: 2025-03-18
Payer: COMMERCIAL

## 2025-03-18 VITALS
DIASTOLIC BLOOD PRESSURE: 77 MMHG | SYSTOLIC BLOOD PRESSURE: 135 MMHG | BODY MASS INDEX: 24.25 KG/M2 | HEIGHT: 68 IN | TEMPERATURE: 97.7 F | RESPIRATION RATE: 18 BRPM | OXYGEN SATURATION: 99 % | HEART RATE: 62 BPM | WEIGHT: 160 LBS

## 2025-03-18 VITALS — HEIGHT: 68 IN | BODY MASS INDEX: 24.25 KG/M2 | WEIGHT: 160 LBS

## 2025-03-18 DIAGNOSIS — S60.131A SUBUNGUAL HEMATOMA OF RIGHT MIDDLE FINGER: ICD-10-CM

## 2025-03-18 DIAGNOSIS — S60.032A CONTUSION OF LEFT MIDDLE FINGER WITHOUT DAMAGE TO NAIL, INITIAL ENCOUNTER: ICD-10-CM

## 2025-03-18 DIAGNOSIS — S63.619A SPRAIN OF FINGER OF RIGHT HAND, INITIAL ENCOUNTER: ICD-10-CM

## 2025-03-18 DIAGNOSIS — S60.032A CONTUSION OF LEFT MIDDLE FINGER WITHOUT DAMAGE TO NAIL, INITIAL ENCOUNTER: Primary | ICD-10-CM

## 2025-03-18 DIAGNOSIS — S60.131A SUBUNGUAL HEMATOMA OF RIGHT MIDDLE FINGER: Primary | ICD-10-CM

## 2025-03-18 DIAGNOSIS — S61.319A LACERATION OF FINGER NAIL BED, INITIAL ENCOUNTER: ICD-10-CM

## 2025-03-18 PROCEDURE — 2500000001 HC RX 250 WO HCPCS SELF ADMINISTERED DRUGS (ALT 637 FOR MEDICARE OP): Performed by: PHYSICIAN ASSISTANT

## 2025-03-18 PROCEDURE — 73140 X-RAY EXAM OF FINGER(S): CPT | Mod: RT

## 2025-03-18 PROCEDURE — 99204 OFFICE O/P NEW MOD 45 MIN: CPT | Performed by: ORTHOPAEDIC SURGERY

## 2025-03-18 PROCEDURE — 2500000004 HC RX 250 GENERAL PHARMACY W/ HCPCS (ALT 636 FOR OP/ED): Performed by: PHYSICIAN ASSISTANT

## 2025-03-18 PROCEDURE — 99214 OFFICE O/P EST MOD 30 MIN: CPT | Performed by: ORTHOPAEDIC SURGERY

## 2025-03-18 PROCEDURE — 73140 X-RAY EXAM OF FINGER(S): CPT | Mod: RIGHT SIDE | Performed by: STUDENT IN AN ORGANIZED HEALTH CARE EDUCATION/TRAINING PROGRAM

## 2025-03-18 PROCEDURE — 3008F BODY MASS INDEX DOCD: CPT | Performed by: ORTHOPAEDIC SURGERY

## 2025-03-18 RX ORDER — OXYCODONE HYDROCHLORIDE 5 MG/1
5 TABLET ORAL ONCE
Status: COMPLETED | OUTPATIENT
Start: 2025-03-18 | End: 2025-03-18

## 2025-03-18 RX ORDER — ONDANSETRON 4 MG/1
4 TABLET, ORALLY DISINTEGRATING ORAL ONCE
Status: COMPLETED | OUTPATIENT
Start: 2025-03-18 | End: 2025-03-18

## 2025-03-18 RX ORDER — IBUPROFEN 600 MG/1
600 TABLET ORAL ONCE
Status: COMPLETED | OUTPATIENT
Start: 2025-03-18 | End: 2025-03-18

## 2025-03-18 RX ADMIN — OXYCODONE 5 MG: 5 TABLET ORAL at 01:36

## 2025-03-18 RX ADMIN — IBUPROFEN 600 MG: 600 TABLET ORAL at 01:36

## 2025-03-18 RX ADMIN — ONDANSETRON 4 MG: 4 TABLET, ORALLY DISINTEGRATING ORAL at 01:36

## 2025-03-18 ASSESSMENT — PATIENT HEALTH QUESTIONNAIRE - PHQ9
1. LITTLE INTEREST OR PLEASURE IN DOING THINGS: NOT AT ALL
2. FEELING DOWN, DEPRESSED OR HOPELESS: NOT AT ALL
SUM OF ALL RESPONSES TO PHQ9 QUESTIONS 1 AND 2: 0

## 2025-03-18 ASSESSMENT — PAIN DESCRIPTION - ORIENTATION: ORIENTATION: RIGHT

## 2025-03-18 ASSESSMENT — PAIN SCALES - GENERAL: PAINLEVEL_OUTOF10: 9

## 2025-03-18 ASSESSMENT — PAIN DESCRIPTION - LOCATION: LOCATION: HAND

## 2025-03-18 NOTE — Clinical Note
John Turner was seen and treated in our emergency department on 3/17/2025.  He may return to work on 03/19/2025.  With light duty.  Must wear splint on right middle finger until cleared by follow-up physician     If you have any questions or concerns, please don't hesitate to call.      Nhan Estrella PA-C

## 2025-03-18 NOTE — ED PROVIDER NOTES
HPI   Chief Complaint   Patient presents with    Hand Injury     Pt injured right hand at work 5 days ago.       This is a 34-year-old gentleman presents with a chief complaint of acute injury to his right middle finger on Tuesday.  Patient states that the tip got crushed in a press.  He now has bruising and meet the fingernail with pain and swelling extending to the right middle finger.  He has been taking ibuprofen with little relief of symptoms.  He denies any numbness or tingling distally.  Rates his pain a 6 out of a 10.      History provided by:  Patient and medical records          Patient History   No past medical history on file.  No past surgical history on file.  No family history on file.  Social History     Tobacco Use    Smoking status: Some Days     Types: Cigarettes    Smokeless tobacco: Never   Vaping Use    Vaping status: Never Used   Substance Use Topics    Alcohol use: Defer    Drug use: Never       Physical Exam   ED Triage Vitals [03/17/25 2351]   Temperature Heart Rate Respirations BP   36.5 °C (97.7 °F) 62 18 137/77      Pulse Ox Temp Source Heart Rate Source Patient Position   99 % Temporal Monitor Sitting      BP Location FiO2 (%)     Right arm --       Physical Exam  Vitals and nursing note reviewed.   Constitutional:       Appearance: Normal appearance. He is normal weight.   HENT:      Head: Normocephalic and atraumatic.   Cardiovascular:      Rate and Rhythm: Normal rate and regular rhythm.   Musculoskeletal:         General: Swelling and tenderness present.      Right forearm: Normal.      Left forearm: Normal.      Right wrist: Normal.      Left wrist: Normal.      Right hand: Swelling, tenderness and bony tenderness present. Decreased range of motion.      Left hand: Normal.        Arms:         Hands:       Cervical back: Normal range of motion and neck supple.      Comments: Pain and swelling to the tip of the right middle finger with a subungual hematoma.  No lacerations, limited  flexion extension.  No sign of infection   Skin:     General: Skin is warm.      Findings: Bruising present.      Comments: Subungual hematoma right middle finger nail   Neurological:      General: No focal deficit present.      Mental Status: He is alert and oriented to person, place, and time. Mental status is at baseline.      Sensory: No sensory deficit.   Psychiatric:         Mood and Affect: Mood normal.         Behavior: Behavior normal.         Thought Content: Thought content normal.         Judgment: Judgment normal.           ED Course & MDM   Diagnoses as of 03/18/25 0206   Contusion of left middle finger without damage to nail, initial encounter   Subungual hematoma of right middle finger                 No data recorded     Andrzej Coma Scale Score: 15 (03/17/25 2352 : Olayinka Cain RN)                           Medical Decision Making  Temperature 36.5, heart rate 62, respirations 18, blood pressure 137/77, pulse ox is 99% on room air  X-ray of the finger was ordered.  Patient was medicated with Motrin 600 mg p.o., oxycodone 5 mg p.o. Zofran 4 mg ODT  X-ray of the finger shows no acute fracture, no radiopaque foreign body, no soft tissue gas.  I discussed results of workup with the patient.  Patient has a subungual hematoma with no sign of any fracture.  Patient was placed in a finger splint by me.  He was given prescriptions for naproxen and referred to his PCP for follow-up.  He is given a work excuse, he was advised to return back to the ER with any concerns or worsening of symptoms all questions answered prior to discharge        Procedure  Splint Application    Performed by: Nhan Estrella PA-C  Authorized by: Nhan Estrella PA-C    Consent:     Consent obtained:  Verbal    Consent given by:  Patient    Risks, benefits, and alternatives were discussed: yes      Risks discussed:  Discoloration, numbness, pain and swelling  Universal protocol:     Procedure explained and questions  answered to patient or proxy's satisfaction: yes      Imaging studies available: yes      Patient identity confirmed:  Verbally with patient  Pre-procedure details:     Distal neurologic exam:  Normal    Distal perfusion: distal pulses strong      Distal perfusion comment:  Pad is pink and has good perfusion  Procedure details:     Location:  Finger    Finger location:  R long finger    Splint type:  Finger    Supplies:  Prefabricated splint and aluminum splint    Attestation: Splint applied and adjusted personally by me    Post-procedure details:     Distal neurologic exam:  Normal    Distal perfusion: distal pulses strong      Procedure completion:  Tolerated well, no immediate complications    Post-procedure imaging: not applicable         Nhan Estrella PA-C  03/18/25 0207       Nhan Estrella PA-C  03/23/25 0554

## 2025-03-18 NOTE — Clinical Note
John Turner was seen and treated in our emergency department on 3/17/2025.  He may return to work on 03/20/2025.       If you have any questions or concerns, please don't hesitate to call.      Nhan Estrella PA-C

## 2025-03-18 NOTE — LETTER
March 18, 2025     Patient: John Turner   YOB: 1990   Date of Visit: 3/18/2025       To Whom It May Concern:    It is my medical opinion that John Turner may return to light duty immediately with the following restrictions: no ramming for the next 2 weeks .    If you have any questions or concerns, please don't hesitate to call.             Harley Hutson, DO

## 2025-03-18 NOTE — PROGRESS NOTES
History present illness: Patient presents today 7 days status post crushing injury to the right long finger.  He works at [employer].  He describes a blunt force crushing injury.  The patient describes initial swelling to the long finger.  To some degree he sustained injury to ring finger long finger and index but the long finger took the brunt of the force.  He describes progressive swelling and stiffness affecting motion at the PIP joint to the right long finger.  He noticed bleeding that occurred under the nail plate soon after the injury.  He presents today in a somewhat delayed fashion.      Past medical history: The patient's past medical history, family history, social history, and review of systems were documented on the patient medical intake.  The updated data was reviewed in the electronic medical record.  History is negative except otherwise stated in history of present illness.        Physical examination:  General: Alert and oriented to person, place, and time.  No acute distress and breathing comfortably: Pleasant and cooperative with examination.  HEENT: Head is normocephalic and atraumatic.  Neck: Supple, no visible swelling.  Cardiovascular: No palpable tachycardia  Lungs: No audible wheezing or labored breathing  Abdomen: Nondistended.  Extremities: Evaluation of the right upper extremity finds the patient had palpable radial artery at the wrist with brisk capillary refill to all digits.  Patient has intact sensation to axillary radial median and ulnar nerves.  There are no open wounds.  There are no signs of infection.  There is no evidence of lymphedema or lymphatic streaking.  The patient has supple compartments to right arm forearm and hand.  Subungual hematoma comprising approximately 50 to 60% of the total subungual space within the proximal half of that space consistent with nailbed laceration.  Central slip terminal extension FDS and FDP intact.  Tenderness about the right long finger  at PIP and DIP with apprehension to motion through flexion extension arc.      Radiology:      Assessment: Injury in the workplace right long finger with PIP and DIP sprain stiffness of digit and right long finger nailbed laceration.      Plan: Recommendations were made for light duty status discontinuation of splint immobilization and range of motion exercises for finger flexion and extension under the guidance of therapy.  Follow-up with me in 2 weeks.  X-rays right long finger upon return to office.        Procedure:

## 2025-03-18 NOTE — LETTER
March 18, 2025     Patient: John Turner   YOB: 1990   Date of Visit: 3/18/2025       To Whom It May Concern:    It is my medical opinion that John Turner may return to work on 03/19/25 with light duty .    If you have any questions or concerns, please don't hesitate to call.               Harley Hutson, DO

## 2025-03-25 ENCOUNTER — TELEPHONE (OUTPATIENT)
Dept: PHYSICAL THERAPY | Facility: CLINIC | Age: 35
End: 2025-03-25

## 2025-03-25 ENCOUNTER — APPOINTMENT (OUTPATIENT)
Dept: PRIMARY CARE | Facility: CLINIC | Age: 35
End: 2025-03-25
Payer: COMMERCIAL

## 2025-03-25 NOTE — TELEPHONE ENCOUNTER
CALLED AND LEFT VM FOR PT TO CALL BACK GAVE HIM THE OPTION OF JONYT ALONG WITH THE [facility] TO CALL BACK AND GET SCHEDULED [facility] APPROVAL IN CHART

## 2025-03-27 ENCOUNTER — EVALUATION (OUTPATIENT)
Dept: OCCUPATIONAL THERAPY | Facility: CLINIC | Age: 35
End: 2025-03-27
Payer: COMMERCIAL

## 2025-03-27 DIAGNOSIS — S61.319A LACERATION OF FINGER NAIL BED, INITIAL ENCOUNTER: ICD-10-CM

## 2025-03-27 DIAGNOSIS — S63.619A SPRAIN OF FINGER OF RIGHT HAND, INITIAL ENCOUNTER: ICD-10-CM

## 2025-03-27 DIAGNOSIS — S60.131A SUBUNGUAL HEMATOMA OF RIGHT MIDDLE FINGER: ICD-10-CM

## 2025-03-27 DIAGNOSIS — S60.131D CONTUSION OF RIGHT MIDDLE FINGER WITH DAMAGE TO NAIL, SUBSEQUENT ENCOUNTER: Primary | ICD-10-CM

## 2025-03-27 PROCEDURE — 97110 THERAPEUTIC EXERCISES: CPT | Mod: GO | Performed by: OCCUPATIONAL THERAPIST

## 2025-03-27 PROCEDURE — 97166 OT EVAL MOD COMPLEX 45 MIN: CPT | Mod: GO | Performed by: OCCUPATIONAL THERAPIST

## 2025-03-27 ASSESSMENT — PATIENT HEALTH QUESTIONNAIRE - PHQ9
2. FEELING DOWN, DEPRESSED OR HOPELESS: NEARLY EVERY DAY
SUM OF ALL RESPONSES TO PHQ9 QUESTIONS 1 AND 2: 6
1. LITTLE INTEREST OR PLEASURE IN DOING THINGS: NEARLY EVERY DAY

## 2025-03-27 ASSESSMENT — PAIN SCALES - GENERAL: PAINLEVEL_OUTOF10: 9

## 2025-03-27 ASSESSMENT — PAIN - FUNCTIONAL ASSESSMENT: PAIN_FUNCTIONAL_ASSESSMENT: 0-10

## 2025-03-27 NOTE — PROGRESS NOTES
Occupational Therapy   Upper Extremity Evaluation Note    Patient Name: John Turner  MRN: [id number]  Referring Physician: Dr. Harley Hutson    Allergies:   NKA Date of Injury: 3/11/25  Mechanism of Injury:  Finger slammed into grid of a flask smashing RMF causing tip to swell when the middle knuckle swelled up. MF sprain. MF laceration  Date of Surgery: NA  Precautions:          Current Problem  1. Contusion of right middle finger with damage to nail, subsequent encounter        2. Subungual hematoma of right middle finger  Referral to Occupational Therapy      3. Sprain of finger of right hand, initial encounter  Referral to Occupational Therapy      4. Laceration of finger nail bed, initial encounter  Referral to Occupational Therapy      MD PLAN: Injury in the workplace right long finger with PIP and DIP sprain stiffness of digit and right long finger nailbed laceration.   Recommendations were made for light duty status discontinuation of splint immobilization and range of motion exercises for finger flexion and extension under the guidance of therapy. Follow-up with me in 2 weeks. X-rays right long finger upon return to office.  Insurance    *S60.131A (ICD-10-CM) - Subungual hematoma of right middle finger  S63.619A (ICD-10-CM) - Sprain of finger of right hand, initial encounter  S61.319A (ICD-10-CM) - Laceration of finger nail bed, initial encounter  [facility] 18 VISITS APPROVED 3-20-25 THRU 5-20- 25       C9: 2-3x/w, 4-6w, 10-12v   GENERAL  General  General Comment: OT Visit #1      IMAGING: NA           Subjective  Patient's Chief Concern: Stiffness  Patient would like to functionally improve: hand ability to use work tools including reamer.   Improvements: knowledge of injury and rehab expectations and work modifications per surgeon.     Pain  Pain Assessment  Pain Assessment: 0-10  0-10 (Numeric) Pain Score: 9  Pain Descriptors:  (Cold increases pain from 5 to 9/10, grasping items increases pain as  does weight bearing.)      Objective  Hand Dominance: RIGHT     Affected Extremity:  RIGHT    Outcome Measures:   Other Outcome Measures: QD50    ADLS/IADLS: IND    Skin/ Wound/Scar: Scar flat, healing well. Tender as expected for stage of recovery.     Sensation: WFL    Dexterity: Monitor    Edema (cm):    Right Left   WRIST DWC 16.5 16.8           Right  Left   FINGER P1 6.8 6.5    PIP Circumference 7.0 6.6    P2 7.3 5.9    DIP Circumference 5.6 5.6       ROM and STRENGTH  AROM Forearm to wrist motion WFL    Hand ROM  THUMB AROM      Kapandji 10/10     Finger   MCP PIP DIP    Index 70 90 40    Middle 90 90 40    Ring 80 95 55    Small 80 95 60   2.5 cm hook fist activation  Hand Strength     Shyam Dynamometer    Gross Grasp (lbs)  Right Left   2nd setting 30 100       Pinch Strength Right Left   Lateral 12 23   Tripod 10 23   Tip  10 16       TREATMENT:  -putty rolls for ED activation and edema management  -AROM hand    -Edema Management    -Soft Tissue Mobilization Putty rolls.   -Vibration pain management  -Press into level 1 putty for tip desensitization.     HEP and Patient Education:  -See above  -Educated patient to diagnosis and rehab expectations including frequency and duration of services.     ASSESSMENT: Pain extensor mechanism during intrinsic plus activation.   Evaluation Data: Patient is a 33 yo RHDM  s/p MF work injury resulting in limited participation in pain-free ADLs and inability to perform at their prior level of function. Skilled Occupational Therapy services are warranted to address the impairments found & listed previously in the objective section in order to return to safe and pain-free ADLs and prior level of function and to to realize measurable change in the outcome measures and achieve improvements in patient’s functional status and individual goals.     Home living/Social Support: Resides home, independently, with partner and 5 year old son.  Occupation:    Medical  Leave/Modified Duty: Light Duty  Meaningful Activities:  Painting and time spent time with family  Prior Level of Function Per Patient/Caregiver Report: Independent with ADL, IADL, work and leisure activities  Patient would like to gain recovery of their RIGHT hand function to improve their level of independence with ADLs, IADLs, Work    PLAN OF CARE:   Plan      Follow Up with Referring Physician: Dr. Harley Hutson    Monitor home program.    Patient instructed to call or email with questions or concerns.    Frequency: 1x/week  Duration: 8 weeks  Comprehensive reassessments will be completed intermittently.   Potential to achieve rehab goals is good . Patient would benefit from skilled Occupational Therapy services to address deficits and promote functional gains and return to timely completion of self care demands and IADL's.       GOALS:   Patient to demonstrate, with involved extremity (ies):    -good skill with progressive edema reduction technique facilitating gains with motion and function.   -good carry through with progressive soft tissue management techniques facilitating gains with motion and pain reduction.   -finger AROM FRANCO 220 degrees, to maintain grasp on ADL objects during use.  -Self report of independence with clothing fasteners without pain.   -independence opening packages, Holguin Pinch 18#  -independence opening jars and turning door knobs,  Strength 70#    Patient verbalized good understanding of Therapy Plan of Care and is in a agreement with Occupational Therapy Goals.     Problems To Be Addressed: Knowledge of precautions, knowledge of HEP, pain and edema management, ROM/joint mobility, coordination, motor skills, strength recovery, endurance recovery, orthosis use, wear/care, precautions.    Planned Interventions include: wound care as needed, patient education/instruction, home program instruction and review, edema control, manual therapy, motor coordination, therapeutic exercise,  therapeutic activities, ADL and IADL retraining, neuromuscular re-education, dry needling, NMES, Fluidotherapy, ultrasound, Kinesiotaping, orthosis fabrication/fit training.    CHART REVIEW: YES    Time Calculation Time Calculation  Start Time: 0845  Stop Time: 0930  Time Calculation (min): 45 min   EVAL     OT Evaluation (Moderate) Time Entry: 20     MODALITIES           TIME  MT    NMR    TE Therapeutic Exercise Time Entry: 35  TA    SCA    ORTH TRAIN    ORTH SUB    LCODE  GRP

## 2025-04-01 ENCOUNTER — HOSPITAL ENCOUNTER (OUTPATIENT)
Dept: RADIOLOGY | Facility: CLINIC | Age: 35
Discharge: HOME | End: 2025-04-01
Payer: COMMERCIAL

## 2025-04-01 ENCOUNTER — OFFICE VISIT (OUTPATIENT)
Dept: ORTHOPEDIC SURGERY | Facility: CLINIC | Age: 35
End: 2025-04-01
Payer: COMMERCIAL

## 2025-04-01 DIAGNOSIS — S63.619A SPRAIN OF FINGER OF RIGHT HAND, INITIAL ENCOUNTER: ICD-10-CM

## 2025-04-01 DIAGNOSIS — S61.319A LACERATION OF FINGER NAIL BED, INITIAL ENCOUNTER: ICD-10-CM

## 2025-04-01 DIAGNOSIS — S60.032A CONTUSION OF LEFT MIDDLE FINGER WITHOUT DAMAGE TO NAIL, INITIAL ENCOUNTER: ICD-10-CM

## 2025-04-01 PROCEDURE — 73140 X-RAY EXAM OF FINGER(S): CPT | Mod: RT

## 2025-04-01 PROCEDURE — 99214 OFFICE O/P EST MOD 30 MIN: CPT | Performed by: ORTHOPAEDIC SURGERY

## 2025-04-01 NOTE — LETTER
April 1, 2025     Patient: John Turner   YOB: 1990   Date of Visit: 4/1/2025       To Whom It May Concern:    It is my medical opinion that John Turner may return to work on 04/02/2025 on light duty status, no lifting greater than 5 pound with the right upper extremity until cleared by orthopedics .    If you have any questions or concerns, please don't hesitate to call [phone].         Sincerely,        Harley Hutson, DO

## 2025-04-01 NOTE — PROGRESS NOTES
History present illness: Presents today for evaluation of the right long finger.  He sustained nailbed laceration secondary to crushing injury with associated sprain of right long finger at PIP and DIP.  He is concerned about his activity level in the workplace and feels that it is not consistent with what was recommended for the duty restrictions we set forth.      Past medical history: The patient's past medical history, family history, social history, and review of systems were documented on the patient medical intake.  The updated data was reviewed in the electronic medical record.  History is negative except otherwise stated in history of present illness.        Physical examination:  General: Alert and oriented to person, place, and time.  No acute distress and breathing comfortably: Pleasant and cooperative with examination.  HEENT: Head is normocephalic and atraumatic.  Neck: Supple, no visible swelling.  Cardiovascular: No palpable tachycardia  Lungs: No audible wheezing or labored breathing  Abdomen: Nondistended.  Extremities: Evaluation of the right upper extremity finds the patient had palpable radial artery at the wrist with brisk capillary refill to all digits.  Patient has intact sensation to axillary radial median and ulnar nerves.  There are no open wounds.  There are no signs of infection.  There is no evidence of lymphedema or lymphatic streaking.  The patient has supple compartments to right arm forearm and hand.  Near full composite fist but lots of pain and apprehension over dorsum of PIP joint with maximum flexion.  This joint is persistently swollen.  Well consolidated subungual hematoma.      Radiology:      Assessment: Right long finger sprain of PIP and DIP with persistent symptoms of pain and stiffness.  Right long finger nailbed laceration.      Plan: Treatment options were discussed.  Recommendations were made for trial of steroid injection to the PIP joint.  We will seek approval  through [facility] organization.  I will see him back once approved.  He is okay for return to the workplace but we will rephrase his duty restrictions to make it more clear.  No lifting greater than 5 pounds with the right upper extremity.  Follow-up for steroid injection once approved by [facility].        Procedure:

## 2025-04-01 NOTE — LETTER
April 1, 2025     Patient: John Turner   YOB: 1990   Date of Visit: 4/1/2025       To Whom It May Concern:    It is my medical opinion that John Turner  should be excused from work for time missed for appointments on 3/27/25 and for today's visit, on 4/1/25 .    If you have any questions or concerns, please don't hesitate to call.         Sincerely,        Harley Hutson, DO

## 2025-04-08 ENCOUNTER — OFFICE VISIT (OUTPATIENT)
Dept: ORTHOPEDIC SURGERY | Facility: CLINIC | Age: 35
End: 2025-04-08
Payer: COMMERCIAL

## 2025-04-08 DIAGNOSIS — S63.692S: Primary | ICD-10-CM

## 2025-04-08 PROCEDURE — 20600 DRAIN/INJ JOINT/BURSA W/O US: CPT | Mod: RT | Performed by: ORTHOPAEDIC SURGERY

## 2025-04-08 PROCEDURE — 2500000004 HC RX 250 GENERAL PHARMACY W/ HCPCS (ALT 636 FOR OP/ED): Performed by: ORTHOPAEDIC SURGERY

## 2025-04-08 PROCEDURE — 99214 OFFICE O/P EST MOD 30 MIN: CPT | Performed by: ORTHOPAEDIC SURGERY

## 2025-04-08 RX ORDER — LIDOCAINE HYDROCHLORIDE 10 MG/ML
0.5 INJECTION, SOLUTION INFILTRATION; PERINEURAL
Status: COMPLETED | OUTPATIENT
Start: 2025-04-08 | End: 2025-04-08

## 2025-04-08 RX ADMIN — TRIAMCINOLONE ACETONIDE 5 MG: 10 INJECTION, SUSPENSION INTRA-ARTICULAR; INTRALESIONAL at 16:05

## 2025-04-08 RX ADMIN — LIDOCAINE HYDROCHLORIDE 0.5 ML: 10 INJECTION, SOLUTION INFILTRATION; PERINEURAL at 16:05

## 2025-04-08 NOTE — PROGRESS NOTES
Hand / UE Inj/Asp: L long PIP for osteoarthritis on 4/8/2025 3:11 PM  Indications: pain  Details: 25 G needle, dorsal approach  Medications: 5 mg triamcinolone acetonide 10 mg/mL; 0.5 mL lidocaine 10 mg/mL (1 %)  Outcome: tolerated well, no immediate complications  Procedure, treatment alternatives, risks and benefits explained, specific risks discussed. Consent was given by the patient. Immediately prior to procedure a time out was called to verify the correct patient, procedure, equipment, support staff and site/side marked as required. Patient was prepped and draped in the usual sterile fashion.

## 2025-04-08 NOTE — LETTER
April 8, 2025     Patient: John Turner   YOB: 1990   Date of Visit: 4/8/2025       To Whom It May Concern:    John Turner was seen in my clinic on 4/8/2025 at 3:00 pm. Please excuse John Dempsey for his absence from work on this day to make the appointment.    If you have any questions or concerns, please don't hesitate to call [phone].         Sincerely,         Harley Hutson, DO        
[Consult Closing:] : Thank you for allowing me to participate in the care of this patient.  If you have any questions, please do not hesitate to contact me.

## 2025-04-08 NOTE — PROGRESS NOTES
History present illness: Patient presents today for evaluation of the right long finger.  His steroid injection has been approved by [facility].  Ongoing pain localized to PIP and to the remainder of the digit.      Past medical history: The patient's past medical history, family history, social history, and review of systems were documented on the patient medical intake.  The updated data was reviewed in the electronic medical record.  History is negative except otherwise stated in history of present illness.        Physical examination:  General: Alert and oriented to person, place, and time.  No acute distress and breathing comfortably: Pleasant and cooperative with examination.  HEENT: Head is normocephalic and atraumatic.  Neck: Supple, no visible swelling.  Cardiovascular: No palpable tachycardia  Lungs: No audible wheezing or labored breathing  Abdomen: Nondistended.  Extremities: Evaluation of the right upper extremity finds the patient had palpable radial artery at the wrist with brisk capillary refill to all digits.  Patient has intact sensation to axillary radial median and ulnar nerves.  There are no open wounds.  There are no signs of infection.  There is no evidence of lymphedema or lymphatic streaking.  The patient has supple compartments to right arm forearm and hand.  Subungual hematoma.  Tenderness and swelling right long finger PIP joint.      Radiology:      Assessment: Right long finger PIP sprain with ongoing pain and stiffness.      Plan: Treatment options were discussed.  Patient elects for steroid injection and follow-up with me in the office in 6 weeks.  No x-rays upon return.        Procedure:  Hand / UE Inj/Asp: R long PIP for osteoarthritis on 4/8/2025 4:05 PM  Indications: pain  Details: 25 G needle, dorsal approach  Medications: 5 mg triamcinolone acetonide 10 mg/mL; 0.5 mL lidocaine 10 mg/mL (1 %)  Outcome: tolerated well, no immediate complications  Procedure, treatment alternatives, risks  and benefits explained, specific risks discussed. Consent was given by the patient. Immediately prior to procedure a time out was called to verify the correct patient, procedure, equipment, support staff and site/side marked as required. Patient was prepped and draped in the usual sterile fashion.

## 2025-04-10 ENCOUNTER — TELEPHONE (OUTPATIENT)
Dept: PHYSICAL THERAPY | Facility: HOSPITAL | Age: 35
End: 2025-04-10
Payer: COMMERCIAL

## 2025-04-10 ENCOUNTER — TREATMENT (OUTPATIENT)
Dept: OCCUPATIONAL THERAPY | Facility: HOSPITAL | Age: 35
End: 2025-04-10
Payer: COMMERCIAL

## 2025-04-10 ENCOUNTER — APPOINTMENT (OUTPATIENT)
Dept: ORTHOPEDIC SURGERY | Facility: CLINIC | Age: 35
End: 2025-04-10
Payer: COMMERCIAL

## 2025-04-10 DIAGNOSIS — S60.131D CONTUSION OF RIGHT MIDDLE FINGER WITH DAMAGE TO NAIL, SUBSEQUENT ENCOUNTER: ICD-10-CM

## 2025-04-10 DIAGNOSIS — S60.131A SUBUNGUAL HEMATOMA OF RIGHT MIDDLE FINGER: Primary | ICD-10-CM

## 2025-04-10 PROCEDURE — 97140 MANUAL THERAPY 1/> REGIONS: CPT | Mod: GO | Performed by: OCCUPATIONAL THERAPIST

## 2025-04-10 PROCEDURE — 97110 THERAPEUTIC EXERCISES: CPT | Mod: GO | Performed by: OCCUPATIONAL THERAPIST

## 2025-04-10 ASSESSMENT — PAIN SCALES - GENERAL: PAINLEVEL_OUTOF10: 9

## 2025-04-10 ASSESSMENT — PAIN DESCRIPTION - DESCRIPTORS: DESCRIPTORS: NUMBNESS

## 2025-04-10 ASSESSMENT — PAIN - FUNCTIONAL ASSESSMENT: PAIN_FUNCTIONAL_ASSESSMENT: 0-10

## 2025-04-10 NOTE — PROGRESS NOTES
"    Occupational Therapy   Upper Extremity Evaluation Note    Patient Name: John Turner  MRN: [id number]  Referring Physician: Dr. Harley Hutson    Allergies:   NKA Date of Injury: 3/11/25  Mechanism of Injury:  Finger slammed into grid of a flask smashing RMF causing tip to swell when the middle knuckle swelled up. MF sprain. MF laceration  Date of Surgery: NA  Precautions:          Current Problem  1. Subungual hematoma of right middle finger        2. Contusion of right middle finger with damage to nail, subsequent encounter          MD PLAN: Injury in the workplace right long finger with PIP and DIP sprain stiffness of digit and right long finger nailbed laceration.   Recommendations were made for light duty status discontinuation of splint immobilization and range of motion exercises for finger flexion and extension under the guidance of therapy. Follow-up with me in 2 weeks. X-rays right long finger upon return to office.  Insurance    *S60.131A (ICD-10-CM) - Subungual hematoma of right middle finger  S63.619A (ICD-10-CM) - Sprain of finger of right hand, initial encounter  S61.319A (ICD-10-CM) - Laceration of finger nail bed, initial encounter  [facility] 18 VISITS APPROVED 3-20-25 THRU 5-20- 25       C9: 2-3x/w, 4-6w, 10-12v   GENERAL  General  General Comment: OT Visit #2      IMAGING: NA           Subjective  Patient compliant with HEP  Patient's Chief Concern: Stiffness  Patient would like to functionally improve: hand ability to use work tools including reamer.   Improvements: knowledge of injury and rehab expectations and work modifications per surgeon.     Pain  Pain Assessment  Pain Assessment: 0-10  0-10 (Numeric) Pain Score: 9  Pain Descriptors: Numbness (Cold increases pain from 7 to 9/10, grasping items increases pain as does weight bearing. Volar middle finger intermittent numbness, \"like when you fall asleep on your hand\" (+) Tinel's RMF Radial Digital Nerve. \"sharp tingles\")  "     Objective  Hand Dominance: RIGHT     Affected Extremity:  RIGHT    Outcome Measures:        ADLS/IADLS: IND    Skin/ Wound/Scar: Scar flat, healing well. Tender as expected for stage of recovery.     Sensation: WFL    Dexterity: Monitor    Edema (cm):    Right Left   WRIST DWC 16.5 16.8           Right  Left   FINGER P1 6.8 6.5    PIP Circumference 7.0 6.6    P2 7.3 5.9    DIP Circumference 5.6 5.6       ROM and STRENGTH  AROM Forearm to wrist motion WFL    Hand ROM  THUMB AROM      Kapandji 10/10     Finger   MCP PIP DIP    Index 70 90 40    Middle 90 95 43    Ring 80 95 55    Small 80 95 60   2.5 cm hook fist activation  Hand Strength     Shyam Dynamometer    Gross Grasp (lbs)  Right Left   2nd setting 30 100       Pinch Strength Right Left   Lateral 12 23   Tripod 10 23   Tip  10 16       TREATMENT:  Motion, edema, pain, nail recheck  -heated flexion stretch  -IASTM ext Mech with end motion position  -lumbrical rolls  -hook fist  -Colditz intrinsic stretch  Cont HEP:  -putty rolls for ED activation and edema management  -AROM hand    -Soft Tissue Mobilization Putty rolls.   -Vibration pain management  -Press into level 2 putty for tip desensitization.     HEP and Patient Education:  -See above     ASSESSMENT: Edema reduced. Pain unchanged. Tinel's MF radial digital nerve; monitor recovery nerve glides. Good skill with updated HEP.   Evaluation Data: Patient is a 33 yo RHDM  s/p MF work injury resulting in limited participation in pain-free ADLs and inability to perform at their prior level of function. Skilled Occupational Therapy services are warranted to address the impairments found & listed previously in the objective section in order to return to safe and pain-free ADLs and prior level of function and to to realize measurable change in the outcome measures and achieve improvements in patient’s functional status and individual goals.     Home living/Social Support: Resides home, independently, with  partner and 5 year old son.  Occupation:    Medical Leave/Modified Duty: Light Duty  Meaningful Activities:  Painting and time spent time with family  Prior Level of Function Per Patient/Caregiver Report: Independent with ADL, IADL, work and leisure activities  Patient would like to gain recovery of their RIGHT hand function to improve their level of independence with ADLs, IADLs, Work    PLAN OF CARE:   Plan      Follow Up with Referring Physician: Dr. Harley Hutson    Monitor home program.    Patient instructed to call or email with questions or concerns.    Frequency: 1x/week  Duration: 8 weeks  Comprehensive reassessments will be completed intermittently.   Potential to achieve rehab goals is good . Patient would benefit from skilled Occupational Therapy services to address deficits and promote functional gains and return to timely completion of self care demands and IADL's.       GOALS:   Patient to demonstrate, with involved extremity (ies):    -good skill with progressive edema reduction technique facilitating gains with motion and function.   -good carry through with progressive soft tissue management techniques facilitating gains with motion and pain reduction.   -finger AROM FRANCO 220 degrees, to maintain grasp on ADL objects during use.  -Self report of independence with clothing fasteners without pain.   -independence opening packages, Holguin Pinch 18#  -independence opening jars and turning door knobs,  Strength 70#    Patient verbalized good understanding of Therapy Plan of Care and is in a agreement with Occupational Therapy Goals.     Problems To Be Addressed: Knowledge of precautions, knowledge of HEP, pain and edema management, ROM/joint mobility, coordination, motor skills, strength recovery, endurance recovery, orthosis use, wear/care, precautions.    Planned Interventions include: wound care as needed, patient education/instruction, home program instruction and review, edema control,  manual therapy, motor coordination, therapeutic exercise, therapeutic activities, ADL and IADL retraining, neuromuscular re-education, dry needling, NMES, Fluidotherapy, ultrasound, Kinesiotaping, orthosis fabrication/fit training.    CHART REVIEW: YES    Time Calculation Time Calculation  Start Time: 0800  Stop Time: 0845  Time Calculation (min): 45 min   EVAL           MODALITIES           TIME  MT Manual Therapy Time Entry: 30  NMR    TE Therapeutic Exercise Time Entry: 15  TA    SCA    ORTH TRAIN    ORTH SUB    LCODE  GRP

## 2025-04-10 NOTE — TELEPHONE ENCOUNTER
[employer] called to adk for revision on work note. Work note revised and faxed thsi date to [phone] to confirm that iw is able to rtw after therapy visit completed. No further action required at this time

## 2025-04-10 NOTE — LETTER
April 10, 2025     Patient: John Turner   YOB: 1990   Date of Visit: 4/10/2025       To Whom It May Concern:    John Turner was seen in my clinic on 4/10/2025 at 8AM. Please excuse John Dempsey for his absence from work to attend his therapy appointment. John may return to work same day as appointment is scheduled.    If you have any questions or concerns, please don't hesitate to call.         Sincerely,         Pamella Paredes, OT        CC: No Recipients

## 2025-04-10 NOTE — LETTER
April 10, 2025     Patient: John Turner   YOB: 1990   Date of Visit: 4/10/2025       To Whom It May Concern:    John Turner was seen in my clinic on 4/10/2025 at 8AM. Please excuse John Dempsey for his absence from work on this day to make the appointment.    If you have any questions or concerns, please don't hesitate to call.         Sincerely,         Pamella Paredes, OT        CC: No Recipients

## 2025-04-17 ENCOUNTER — TREATMENT (OUTPATIENT)
Dept: OCCUPATIONAL THERAPY | Facility: HOSPITAL | Age: 35
End: 2025-04-17
Payer: COMMERCIAL

## 2025-04-17 ENCOUNTER — TELEPHONE (OUTPATIENT)
Dept: PHYSICAL THERAPY | Facility: HOSPITAL | Age: 35
End: 2025-04-17
Payer: COMMERCIAL

## 2025-04-17 DIAGNOSIS — S60.131D CONTUSION OF RIGHT MIDDLE FINGER WITH DAMAGE TO NAIL, SUBSEQUENT ENCOUNTER: ICD-10-CM

## 2025-04-17 DIAGNOSIS — S60.131A SUBUNGUAL HEMATOMA OF RIGHT MIDDLE FINGER: Primary | ICD-10-CM

## 2025-04-17 PROCEDURE — 97140 MANUAL THERAPY 1/> REGIONS: CPT | Mod: GO | Performed by: OCCUPATIONAL THERAPIST

## 2025-04-17 PROCEDURE — 97110 THERAPEUTIC EXERCISES: CPT | Mod: GO | Performed by: OCCUPATIONAL THERAPIST

## 2025-04-17 PROCEDURE — 97018 PARAFFIN BATH THERAPY: CPT | Mod: GO | Performed by: OCCUPATIONAL THERAPIST

## 2025-04-17 ASSESSMENT — PAIN SCALES - GENERAL: PAINLEVEL_OUTOF10: 6

## 2025-04-17 ASSESSMENT — PAIN - FUNCTIONAL ASSESSMENT: PAIN_FUNCTIONAL_ASSESSMENT: 0-10

## 2025-04-17 NOTE — LETTER
April 17, 2025     Patient: John Turner   YOB: 1990   Date of Visit: 4/17/2025       To Whom It May Concern:    John Turner was seen in my clinic on 4/17/2025 at 8AM. Please excuse John Dempsey for his absence from work to attend his therapy appointment. John may return to work same day as appointment is scheduled.    If you have any questions or concerns, please don't hesitate to call.           Sincerely,         Pamella Paredes, OT        CC: No Recipients

## 2025-04-17 NOTE — PROGRESS NOTES
"    Occupational Therapy   Upper Extremity Progress Note    Patient Name: John Turner  MRN: [id number]  Referring Physician: Dr. Harley Hutson    Allergies:   NKA Date of Injury: 3/11/25  Mechanism of Injury:  Finger slammed into grid of a flask smashing RMF causing tip to swell when the middle knuckle swelled up. MF sprain. MF laceration  Date of Surgery: NA  Precautions:          Current Problem  1. Subungual hematoma of right middle finger        2. Contusion of right middle finger with damage to nail, subsequent encounter            MD PLAN: Injury in the workplace right long finger with PIP and DIP sprain stiffness of digit and right long finger nailbed laceration.   Recommendations were made for light duty status discontinuation of splint immobilization and range of motion exercises for finger flexion and extension under the guidance of therapy. Follow-up with me in 2 weeks. X-rays right long finger upon return to office.  Insurance    *S60.131A (ICD-10-CM) - Subungual hematoma of right middle finger  S63.619A (ICD-10-CM) - Sprain of finger of right hand, initial encounter  S61.319A (ICD-10-CM) - Laceration of finger nail bed, initial encounter  [facility] 18 VISITS APPROVED 3-20-25 THRU 5-20- 25       C9: 2-3x/w, 4-6w, 10-12v   GENERAL  General  General Comment: OT Visit #3      IMAGING: NA           Subjective  Patient compliant with HEP. Patient with RTW questions. Therapy form for employer provided indicating participation in OT services.    Patient's Chief Concern: Stiffness  Patient would like to functionally improve: hand ability to use work tools including reamer.   Improvements: knowledge of injury and rehab expectations and work modifications per surgeon.     Pain  Pain Assessment  Pain Assessment: 0-10  0-10 (Numeric) Pain Score: 6  Pain Descriptors:  (Grasping items and weight bearing increases pain. Volar middle finger intermittent numbness, \"like when you fall asleep on your hand\" (+) Tinel's " "RMF Radial Digital Nerve begining at PIP joint. \"sharp\")      Objective  Hand Dominance: RIGHT     Affected Extremity:  RIGHT    Outcome Measures:        ADLS/IADLS: IND    Skin/ Wound/Scar: Scar flat, healing well. Tender as expected for stage of recovery.     Sensation: WFL    Dexterity: Monitor    Edema (cm):    Right Left   WRIST DWC 16.5 16.8           Right  Left   FINGER P1 6.8 6.5    PIP Circumference 7.0 6.6    P2 7.3 5.9    DIP Circumference 5.6 5.6       ROM and STRENGTH  AROM Forearm to wrist motion WFL    Hand ROM  THUMB AROM      Kapandji 10/10     Finger   MCP PIP DIP A Hook    Index 70 90 40 2.0    Middle 90 95 43 1.0    Ring 80 95 55 0.5    Small 80 95 60 0.3   2.Dorsal wrist pain during hook fist activation. Wrist Extensor muscle bias with efforts during tendon glides.     Hand Strength     Shyam Dynamometer    Gross Grasp (lbs)  Right Left   2nd setting 30 100       Pinch Strength Right Left   Lateral 12 23   Tripod 10 23   Tip  10 16       TREATMENT:  Paraffin right hand  -IASTM ext Mech with end motion position  -lumbrical rolls  -hook fist  -Colditz intrinsic stretch  EDC Level 3 putty  Cont HEP:  -putty rolls for ED activation and edema management  -AROM hand    -Soft Tissue Mobilization Putty rolls.   -Press into level 2 putty for tip desensitization.     HEP and Patient Education:  -See above     ASSESSMENT: Edema reduced. Pain improving in frequency as well as quality. Tinel's MF radial digital nerve moving distally at PIP joint. Monitor recovery nerve glides. Good skill with updated HEP.   Evaluation Data: Patient is a 35 yo RHDM  s/p MF work injury resulting in limited participation in pain-free ADLs and inability to perform at their prior level of function. Skilled Occupational Therapy services are warranted to address the impairments found & listed previously in the objective section in order to return to safe and pain-free ADLs and prior level of function and to to realize measurable " change in the outcome measures and achieve improvements in patient’s functional status and individual goals.     Home living/Social Support: Resides home, independently, with partner and 5 year old son.  Occupation:    Medical Leave/Modified Duty: Light Duty  Meaningful Activities:  Painting and time spent time with family  Prior Level of Function Per Patient/Caregiver Report: Independent with ADL, IADL, work and leisure activities  Patient would like to gain recovery of their RIGHT hand function to improve their level of independence with ADLs, IADLs, Work    PLAN OF CARE:   Plan      Follow Up with Referring Physician: Dr. Harley Hutson    Monitor home program.    Patient instructed to call or email with questions or concerns.    Frequency: 1x/week  Duration: 8 weeks  Comprehensive reassessments will be completed intermittently.   Potential to achieve rehab goals is good . Patient would benefit from skilled Occupational Therapy services to address deficits and promote functional gains and return to timely completion of self care demands and IADL's.       GOALS:   Patient to demonstrate, with involved extremity (ies):    -good skill with progressive edema reduction technique facilitating gains with motion and function.   -good carry through with progressive soft tissue management techniques facilitating gains with motion and pain reduction.   -finger AROM FRANCO 220 degrees, to maintain grasp on ADL objects during use.  -Self report of independence with clothing fasteners without pain.   -independence opening packages, Holguin Pinch 18#  -independence opening jars and turning door knobs,  Strength 70#    Patient verbalized good understanding of Therapy Plan of Care and is in a agreement with Occupational Therapy Goals.     Problems To Be Addressed: Knowledge of precautions, knowledge of HEP, pain and edema management, ROM/joint mobility, coordination, motor skills, strength recovery, endurance recovery,  orthosis use, wear/care, precautions.    Planned Interventions include: wound care as needed, patient education/instruction, home program instruction and review, edema control, manual therapy, motor coordination, therapeutic exercise, therapeutic activities, ADL and IADL retraining, neuromuscular re-education, dry needling, NMES, Fluidotherapy, ultrasound, Kinesiotaping, orthosis fabrication/fit training.    CHART REVIEW: YES    Time Calculation Time Calculation  Start Time: 0807  Stop Time: 0850  Time Calculation (min): 43 min   EVAL           MODALITIES     Paraffin Bath Time Entry: 10     TIME  MT Manual Therapy Time Entry: 15  NMR    TE Therapeutic Exercise Time Entry: 18  TA    SCA    ORTH TRAIN    ORTH SUB    LCODE  GRP

## 2025-04-17 NOTE — TELEPHONE ENCOUNTER
RTW LETTER GIVEN TO PT & FAXED TO EMPLOYER TO CONFIRM PT PRESENT FOR THERAPY APPT AND OK TO RTW SAME DATE. NO FURTHER ACTION REQUIRED.

## 2025-04-24 ENCOUNTER — TREATMENT (OUTPATIENT)
Dept: OCCUPATIONAL THERAPY | Facility: HOSPITAL | Age: 35
End: 2025-04-24
Payer: COMMERCIAL

## 2025-04-24 ENCOUNTER — TELEPHONE (OUTPATIENT)
Dept: PHYSICAL THERAPY | Facility: HOSPITAL | Age: 35
End: 2025-04-24
Payer: COMMERCIAL

## 2025-04-24 DIAGNOSIS — S60.131A SUBUNGUAL HEMATOMA OF RIGHT MIDDLE FINGER: Primary | ICD-10-CM

## 2025-04-24 DIAGNOSIS — S60.131D CONTUSION OF RIGHT MIDDLE FINGER WITH DAMAGE TO NAIL, SUBSEQUENT ENCOUNTER: ICD-10-CM

## 2025-04-24 ASSESSMENT — PAIN SCALES - GENERAL: PAINLEVEL_OUTOF10: 5 - MODERATE PAIN

## 2025-04-24 ASSESSMENT — PAIN - FUNCTIONAL ASSESSMENT: PAIN_FUNCTIONAL_ASSESSMENT: 0-10

## 2025-04-24 NOTE — TELEPHONE ENCOUNTER
RTW LETTER GIVEN TO PT & FAXED TO [facility] [employer] [facility]. NO FURTHER ACTION IS REQUIRED AT THIS TIME

## 2025-04-24 NOTE — LETTER
April 24, 2025     Patient: John Turner   YOB: 1990   Date of Visit: 4/24/2025       To Whom It May Concern:    John Turner was seen in my clinic on 4/24/2025 at 8 AM. Please excuse John Dempsey for his absence from work to attend his therapy appointment. John may return to work same day as appointment is scheduled.    If you have any questions or concerns, please don't hesitate to call.           Sincerely,         Pamella Paredes, OT        CC: No Recipients

## 2025-04-24 NOTE — PROGRESS NOTES
Occupational Therapy   Upper Extremity Progress Note    Patient Name: John Turner  MRN: [id number]  Referring Physician: Dr. Harley Hutson    Allergies:   NKA Date of Injury: 3/11/25  Mechanism of Injury:  Finger slammed into grid of a flask smashing RMF causing tip to swell when the middle knuckle swelled up. MF sprain. MF laceration  Date of Surgery: NA  Precautions:          Current Problem  1. Subungual hematoma of right middle finger        2. Contusion of right middle finger with damage to nail, subsequent encounter              MD PLAN: Injury in the workplace right long finger with PIP and DIP sprain stiffness of digit and right long finger nailbed laceration.   Recommendations were made for light duty status discontinuation of splint immobilization and range of motion exercises for finger flexion and extension under the guidance of therapy. Follow-up with me in 2 weeks. X-rays right long finger upon return to office.  Insurance    *S60.131A (ICD-10-CM) - Subungual hematoma of right middle finger  S63.619A (ICD-10-CM) - Sprain of finger of right hand, initial encounter  S61.319A (ICD-10-CM) - Laceration of finger nail bed, initial encounter  [facility] 18 VISITS APPROVED 3-20-25 THRU 5-20- 25       C9: 2-3x/w, 4-6w, 10-12v   GENERAL  General  General Comment: OT Visit #4      IMAGING: NA           Subjective  Patient off work this week. Patient complaint of increased pain with cold temperatures. Patient compliant with HEP. Therapy form for employer provided indicating participation in OT services.    Patient's Chief Concern: Stiffness  Patient would like to functionally improve: hand ability to use work tools including reamer.   Improvements: knowledge of injury and rehab expectations and work modifications per surgeon.     Pain  Pain Assessment  Pain Assessment: 0-10  0-10 (Numeric) Pain Score: 5 - Moderate pain        Objective  Hand Dominance: RIGHT     Affected Extremity:  RIGHT    Outcome  "Measures: QD50 at time of evaluation       ADLS/IADLS: IND    Skin/ Wound/Scar: RMF Nail bed with black hematoma.    Sensation: (+) Tinel's RMF radial PIP joint. \"Buzz, tingle\". Sharp pain resolved.     Dexterity: IND with ADL self fasteners per patient report.     Edema (cm):    Right Left   WRIST DWC 16.5 16.8           Right  Left   FINGER P1 6.8 6.5    PIP Circumference 7.0 6.6    P2 7.0 5.9    DIP Circumference 5.6 5.6   4/24/25: Patient is wrapping with coban at night; continue to monitor volumes    ROM and STRENGTH  AROM Forearm to wrist motion WFL    Hand ROM  THUMB AROM      Kapandji 10/10     Finger   MCP PIP DIP A Hook    Index 70 90 40 2.0    Middle 90 95 43 1.0    Ring 80 95 55 0.5    Small 80 95 60 0.3   2.Dorsal wrist pain during hook fist activation. Wrist Extensor muscle bias with efforts during tendon glides.     Hand Strength     Shyam Dynamometer    Gross Grasp (lbs)  Right Left   2nd setting 58 100       Pinch Strength Right Left   Lateral 18 23   Tripod 14 23   Tip  11 16       TREATMENT:  Paraffin right hand  Green Tbar Left grasp wrist ext 30 reps, grasp with wrist flex 30 reps  Blue Tbar P/S 30 each   and pull blue putty.   Cont HEP:  -Median nerve glides  -Soft Tissue Mobilization Blue Putty rolls.   -Press into level 3 putty for tip desensitization.   -Coban wrap at night    HEP and Patient Education:  -See above     ASSESSMENT: Edema reduced. Pain improving in frequency as well as quality. Tinel's MF radial digital nerve moving distally at PIP joint. Session increased pain from 5/10 to 7/10 as he completed  tasks. Monitor recovery of digital nerve pain given nerve glides. Good skill with updated HEP.   Evaluation Data: Patient is a 33 yo RHDM  s/p MF work injury resulting in limited participation in pain-free ADLs and inability to perform at their prior level of function. Skilled Occupational Therapy services are warranted to address the impairments found & listed previously in " the objective section in order to return to safe and pain-free ADLs and prior level of function and to to realize measurable change in the outcome measures and achieve improvements in patient’s functional status and individual goals.     Home living/Social Support: Resides home, independently, with partner and 5 year old son.  Occupation:    Medical Leave/Modified Duty: Light Duty  Meaningful Activities:  Painting and time spent time with family  Prior Level of Function Per Patient/Caregiver Report: Independent with ADL, IADL, work and leisure activities  Patient would like to gain recovery of their RIGHT hand function to improve their level of independence with ADLs, IADLs, Work    PLAN OF CARE:   Plan      Follow Up with Referring Physician: Dr. Harley Hutson    Monitor home program.    Patient instructed to call or email with questions or concerns.    Frequency: 1x/week  Duration: 8 weeks  Comprehensive reassessments will be completed intermittently.   Potential to achieve rehab goals is good . Patient would benefit from skilled Occupational Therapy services to address deficits and promote functional gains and return to timely completion of self care demands and IADL's.       GOALS:   Patient to demonstrate, with involved extremity (ies):    -good skill with progressive edema reduction technique facilitating gains with motion and function.   -good carry through with progressive soft tissue management techniques facilitating gains with motion and pain reduction.   -finger AROM FRANCO 220 degrees, to maintain grasp on ADL objects during use.  -Self report of independence with clothing fasteners without pain.   -independence opening packages, Holguin Pinch 18#  -independence opening jars and turning door knobs,  Strength 70#    Patient verbalized good understanding of Therapy Plan of Care and is in a agreement with Occupational Therapy Goals.     Problems To Be Addressed: Knowledge of precautions, knowledge of  HEP, pain and edema management, ROM/joint mobility, coordination, motor skills, strength recovery, endurance recovery, orthosis use, wear/care, precautions.    Planned Interventions include: wound care as needed, patient education/instruction, home program instruction and review, edema control, manual therapy, motor coordination, therapeutic exercise, therapeutic activities, ADL and IADL retraining, neuromuscular re-education, dry needling, NMES, Fluidotherapy, ultrasound, Kinesiotaping, orthosis fabrication/fit training.    CHART REVIEW: YES    Time Calculation     EVAL           MODALITIES           TIME  MT    NMR    TE    TA    SCA    ORTH TRAIN    ORTH SUB    LCODE  GRP

## 2025-05-01 ENCOUNTER — TREATMENT (OUTPATIENT)
Dept: OCCUPATIONAL THERAPY | Facility: HOSPITAL | Age: 35
End: 2025-05-01
Payer: COMMERCIAL

## 2025-05-01 ENCOUNTER — TELEPHONE (OUTPATIENT)
Dept: PHYSICAL THERAPY | Facility: HOSPITAL | Age: 35
End: 2025-05-01
Payer: COMMERCIAL

## 2025-05-01 DIAGNOSIS — S60.131A SUBUNGUAL HEMATOMA OF RIGHT MIDDLE FINGER: Primary | ICD-10-CM

## 2025-05-01 PROCEDURE — 97140 MANUAL THERAPY 1/> REGIONS: CPT | Mod: GO | Performed by: OCCUPATIONAL THERAPIST

## 2025-05-01 ASSESSMENT — PAIN SCALES - GENERAL: PAINLEVEL_OUTOF10: 5 - MODERATE PAIN

## 2025-05-01 ASSESSMENT — PAIN - FUNCTIONAL ASSESSMENT: PAIN_FUNCTIONAL_ASSESSMENT: 0-10

## 2025-05-01 NOTE — TELEPHONE ENCOUNTER
WORK EXCUSE GIVEN TO PT, FAXED TO EMPLOYER, AND [facility] . NO FURTHER ACTION REQUIRED

## 2025-05-01 NOTE — LETTER
May 1, 2025     Patient: John Turner   YOB: 1990   Date of Visit: 5/1/2025       To Whom It May Concern:    John Turner was seen in my clinic on 5/01/2025 at 8AM. Please excuse John Dempsey for his absence from work to attend his therapy appointment. John may return to work same day as appointment is scheduled.    If you have any questions or concerns, please don't hesitate to call.         Sincerely,         Pamella Paredes, OT        CC: No Recipients

## 2025-05-02 NOTE — PROGRESS NOTES
"    Occupational Therapy   Upper Extremity Progress Note    Patient Name: John Turner  MRN: [id number]  Referring Physician: Dr. Harley Hutson    Allergies:   NKA Date of Injury: 3/11/25  Mechanism of Injury:  Finger slammed into grid of a flask smashing RMF causing tip to swell when the middle knuckle swelled up. MF sprain. MF laceration  Date of Surgery: NA  Precautions:          Current Problem  No diagnosis found.        MD PLAN: Injury in the workplace right long finger with PIP and DIP sprain stiffness of digit and right long finger nailbed laceration.   Recommendations were made for light duty status discontinuation of splint immobilization and range of motion exercises for finger flexion and extension under the guidance of therapy. Follow-up with me in 2 weeks. X-rays right long finger upon return to office.  Insurance    *S60.131A (ICD-10-CM) - Subungual hematoma of right middle finger  S63.619A (ICD-10-CM) - Sprain of finger of right hand, initial encounter  S61.319A (ICD-10-CM) - Laceration of finger nail bed, initial encounter  [facility] 18 VISITS APPROVED 3-20-25 THRU 5-20- 25       C9: 2-3x/w, 4-6w, 10-12v   GENERAL  General  General Comment: OT Visit #5      IMAGING: NA           Subjective  Patient with nail change.   Patient's Chief Concern: Stiffness  Patient would like to functionally improve: hand ability to use work tools including reamer.   Improvements: knowledge of injury and rehab expectations and work modifications per surgeon.     Pain  Pain Assessment  Pain Assessment: 0-10  0-10 (Numeric) Pain Score: 5 - Moderate pain        Objective  Hand Dominance: RIGHT     Affected Extremity:  RIGHT    Outcome Measures: QD50 at time of evaluation       ADLS/IADLS: IND    Skin/ Wound/Scar: RMF Nail plate coming off nail bed as of 5/2/25.    Sensation: (+) Tinel's RMF radial PIP joint. \"Buzz, tingle\". Sharp pain resolved.     Dexterity: IND with ADL self fasteners per patient report.     Edema " (cm):    Right Left   WRIST DWC 16.5 16.8           Right  Left   FINGER P1 6.8 6.5    PIP Circumference 7.0 6.6    P2 7.0 5.9    DIP Circumference 5.6 5.6   4/24/25: Patient is wrapping with coban at night; continue to monitor volumes    ROM and STRENGTH  AROM Forearm to wrist motion WFL    Hand ROM  THUMB AROM      Kapandji 10/10     Finger   MCP PIP DIP A Hook    Index 70 90 40 2.0    Middle 90 95 43 1.0    Ring 80 95 55 0.5    Small 80 95 60 0.3   2.Dorsal wrist pain during hook fist activation. Wrist Extensor muscle bias with efforts during tendon glides.     Hand Strength     Shyam Dynamometer    Gross Grasp (lbs)  Right Left   2nd setting 58 100       Pinch Strength Right Left   Lateral 18 23   Tripod 14 23   Tip  11 16       TREATMENT:  Nail care completed.  Patient to keep nail plate secured as needed while it is growing out of the nail fold area.     HEP and Patient Education:  -See above     ASSESSMENT: Nail prevents full session this visit. Nail care provided, patient verbalized good understanding.   Tinel's MF radial digital nerve moving distally at PIP joint. Pain 5/10 during use. Monitor recovery of digital nerve pain given nerve glides. Good skill with updated HEP.   Evaluation Data: Patient is a 35 yo RHDM  s/p MF work injury resulting in limited participation in pain-free ADLs and inability to perform at their prior level of function. Skilled Occupational Therapy services are warranted to address the impairments found & listed previously in the objective section in order to return to safe and pain-free ADLs and prior level of function and to to realize measurable change in the outcome measures and achieve improvements in patient’s functional status and individual goals.     Home living/Social Support: Resides home, independently, with partner and 5 year old son.  Occupation:    Medical Leave/Modified Duty: Light Duty  Meaningful Activities:  Painting and time spent time with family  Prior  Level of Function Per Patient/Caregiver Report: Independent with ADL, IADL, work and leisure activities  Patient would like to gain recovery of their RIGHT hand function to improve their level of independence with ADLs, IADLs, Work    PLAN OF CARE:   Plan      Follow Up with Referring Physician: Dr. Harley Hutson    Monitor home program.    Patient instructed to call or email with questions or concerns.    Frequency: 1x/week  Duration: 8 weeks  Comprehensive reassessments will be completed intermittently.   Potential to achieve rehab goals is good . Patient would benefit from skilled Occupational Therapy services to address deficits and promote functional gains and return to timely completion of self care demands and IADL's.       GOALS:   Patient to demonstrate, with involved extremity (ies):    -good skill with progressive edema reduction technique facilitating gains with motion and function.   -good carry through with progressive soft tissue management techniques facilitating gains with motion and pain reduction.   -finger AROM FRANCO 220 degrees, to maintain grasp on ADL objects during use.  -Self report of independence with clothing fasteners without pain.   -independence opening packages, Holguin Pinch 18#  -independence opening jars and turning door knobs,  Strength 70#    Patient verbalized good understanding of Therapy Plan of Care and is in a agreement with Occupational Therapy Goals.     Problems To Be Addressed: Knowledge of precautions, knowledge of HEP, pain and edema management, ROM/joint mobility, coordination, motor skills, strength recovery, endurance recovery, orthosis use, wear/care, precautions.    Planned Interventions include: wound care as needed, patient education/instruction, home program instruction and review, edema control, manual therapy, motor coordination, therapeutic exercise, therapeutic activities, ADL and IADL retraining, neuromuscular re-education, dry needling, NMES, Fluidotherapy,  ultrasound, Kinesiotaping, orthosis fabrication/fit training.    CHART REVIEW: YES    Time Calculation     EVAL           MODALITIES           TIME  MT    NMR    TE    TA    SCA    ORTH TRAIN    ORTH SUB    LCODE  GRP

## 2025-05-07 ENCOUNTER — APPOINTMENT (OUTPATIENT)
Dept: OCCUPATIONAL THERAPY | Facility: HOSPITAL | Age: 35
End: 2025-05-07
Payer: COMMERCIAL

## 2025-05-09 ENCOUNTER — TREATMENT (OUTPATIENT)
Dept: OCCUPATIONAL THERAPY | Facility: HOSPITAL | Age: 35
End: 2025-05-09
Payer: COMMERCIAL

## 2025-05-09 DIAGNOSIS — S60.131A SUBUNGUAL HEMATOMA OF RIGHT MIDDLE FINGER: Primary | ICD-10-CM

## 2025-05-09 DIAGNOSIS — S60.131D CONTUSION OF RIGHT MIDDLE FINGER WITH DAMAGE TO NAIL, SUBSEQUENT ENCOUNTER: ICD-10-CM

## 2025-05-09 PROCEDURE — 97110 THERAPEUTIC EXERCISES: CPT | Mod: GO | Performed by: OCCUPATIONAL THERAPIST

## 2025-05-09 ASSESSMENT — PAIN SCALES - GENERAL: PAINLEVEL_OUTOF10: 2

## 2025-05-09 ASSESSMENT — PAIN - FUNCTIONAL ASSESSMENT: PAIN_FUNCTIONAL_ASSESSMENT: 0-10

## 2025-05-09 NOTE — PROGRESS NOTES
Occupational Therapy   Upper Extremity Progress and discharge Note    Patient Name: John Turner  MRN: [id number]  Referring Physician: Dr. Harley Hutson    Allergies:   NKA Date of Injury: 3/11/25  Mechanism of Injury:  Finger slammed into grid of a flask smashing RMF causing tip to swell when the middle knuckle swelled up. MF sprain. MF laceration  Date of Surgery: NA  Precautions:          Current Problem  1. Subungual hematoma of right middle finger        2. Contusion of right middle finger with damage to nail, subsequent encounter        MD PLAN: Injury in the workplace right long finger with PIP and DIP sprain stiffness of digit and right long finger nailbed laceration.   Recommendations were made for light duty status discontinuation of splint immobilization and range of motion exercises for finger flexion and extension under the guidance of therapy. Follow-up with me in 2 weeks. X-rays right long finger upon return to office.  Insurance    *S60.131A (ICD-10-CM) - Subungual hematoma of right middle finger  S63.619A (ICD-10-CM) - Sprain of finger of right hand, initial encounter  S61.319A (ICD-10-CM) - Laceration of finger nail bed, initial encounter  [facility] 18 VISITS APPROVED 3-20-25 THRU 5-20- 25       C9: 2-3x/w, 4-6w, 10-12v   GENERAL  General  General Comment: OT Visit #6/discharge    IMAGING: NA           Subjective  Patient's loose nail has recovered  Patient's Chief Concern: Stiffness  Patient would like to functionally improve: hand ability to use work tools including reamer.   Improvements: knowledge of injury and rehab expectations and work modifications per surgeon.     Pain  Pain Assessment  Pain Assessment: 0-10  0-10 (Numeric) Pain Score: 2        Objective  Hand Dominance: RIGHT     Affected Extremity:  RIGHT    Outcome Measures: QD50 at time of evaluation. Quick Dash 0.00     ADLS/IADLS: IND    Skin/ Wound/Scar: RMF Nail plate coming off nail bed as of 5/2/25. As of 5/9/25 nail is  progressing along nail bed wiothout complications.     Sensation: (-) Tinel's RMF radial digital nerve. Patient had been reporting electrical sensation.     Dexterity: IND      Edema (cm):    Right Left   WRIST DWC 16.4 16.8           Right  Left   FINGER P1 6.3 6.5    PIP Circumference 6.5 6.6    P2 5.8 5.9    DIP Circumference 5.3 5.6       ROM and STRENGTH  AROM Forearm to wrist motion WFL    Hand ROM  THUMB AROM      [place] 10/10   HAND AROM WNL    Hand Strength     Shyam Dynamometer    Gross Grasp (lbs)  Right Left   2nd setting 110 100       Pinch Strength Right Left   Lateral 18 23   Tripod 14 23   Tip  11 16       TREATMENT:  Nail care completed.  Patient to keep nail plate secured as needed while it is growing out of the nail fold area.     HEP and Patient Education:  -return to function    ASSESSMENT: Goals Met.   Evaluation Data: Patient is a 33 yo RHDM  s/p MF work injury resulting in limited participation in pain-free ADLs and inability to perform at their prior level of function. Skilled Occupational Therapy services are warranted to address the impairments found & listed previously in the objective section in order to return to safe and pain-free ADLs and prior level of function and to to realize measurable change in the outcome measures and achieve improvements in patient’s functional status and individual goals.     Home living/Social Support: Resides home, independently, with partner and 5 year old son.  Occupation:    Medical Leave/Modified Duty: Light Duty  Meaningful Activities:  Painting and time spent time with family  Prior Level of Function Per Patient/Caregiver Report: Independent with ADL, IADL, work and leisure activities  Patient would like to gain recovery of their RIGHT hand function to improve their level of independence with ADLs, IADLs, Work    PLAN OF CARE:   Plan  Goals Met. If released by surgeon, patient can RTW.     GOALS:   Patient to demonstrate, with involved  extremity (ies):    -good skill with progressive edema reduction technique facilitating gains with motion and function.   -good carry through with progressive soft tissue management techniques facilitating gains with motion and pain reduction.   -finger AROM FRANCO 220 degrees, to maintain grasp on ADL objects during use.  -Self report of independence with clothing fasteners without pain.   -independence opening packages, [place]in Pinch 18#  -independence opening jars and turning door knobs,  Strength 70#    Patient verbalized good understanding of Therapy Plan of Care and is in a agreement with Occupational Therapy Goals.       CHART REVIEW: YES    Time Calculation Time Calculation  Start Time: 1000  Stop Time: 1020  Time Calculation (min): 20 min   MODALITIES           TIME  MT    NMR    TE Therapeutic Exercise Time Entry: 20

## 2025-05-15 ENCOUNTER — APPOINTMENT (OUTPATIENT)
Dept: OCCUPATIONAL THERAPY | Facility: HOSPITAL | Age: 35
End: 2025-05-15
Payer: COMMERCIAL

## 2025-05-19 ENCOUNTER — OFFICE VISIT (OUTPATIENT)
Dept: ORTHOPEDIC SURGERY | Facility: CLINIC | Age: 35
End: 2025-05-19
Payer: COMMERCIAL

## 2025-05-19 DIAGNOSIS — S60.032A CONTUSION OF LEFT MIDDLE FINGER WITHOUT DAMAGE TO NAIL, INITIAL ENCOUNTER: ICD-10-CM

## 2025-05-19 DIAGNOSIS — S63.692S: Primary | ICD-10-CM

## 2025-05-19 PROCEDURE — 99213 OFFICE O/P EST LOW 20 MIN: CPT | Performed by: ORTHOPAEDIC SURGERY

## 2025-05-19 NOTE — PROGRESS NOTES
5/19/2025    Chief Complaint   Patient presents with    Right Middle Finger - Worker's Compensation, Follow-up     S/P: pip inj 4/8/25       History of Present Illness:  Patient John Turner , 34 y.o. male, presents today, 5/19/2025, for evaluation of right middle finger pain and swelling.  He sustained a crush injury resulting in sprain in the workplace in March of this year.  He underwent injection of the right long finger proximal interphalangeal joint on 4/8/2025.  Since that pain he had great reduction in pain and swelling.  He did a formal course of therapy to work on motion recovery and gentle endurance and strengthening.  He is already been discharged to home program.  Denies any new injury or trauma.  He feels he is making good recovery overall.       Review of Systems:   GENERAL: Negative  GI: Negative  MUSCULOSKELETAL: See HPI  SKIN: Negative  NEURO:  Negative     Physical Exam:  GENERAL:  Alert and oriented to person, place, and time.  No acute distress and breathing comfortably; pleasant and cooperative with the examination.  HEENT:  Head is normocephalic and atraumatic.  NECK:  Supple, no visible swelling.  CARDIOVASCULAR:  No palpable tachycardia.  LUNGS:  No audible wheezing or labored breathing.  ABDOMEN:  Nondistended.  Extremities: Evaluation of the right upper extremity finds the patient to have a palpable radial artery at the wrist with brisk capillary refill to all digits. The patient has intact sensorium to axillary, radial, median and ulnar nerves. There are no open wounds. There are no signs of infection. There is no evidence of lymphedema or lymphatic streaking. The patient has supple compartments of the right arm, forearm and hand.  He demonstrates no tenderness palpation over the proximal interphalangeal joint to the right long finger.  He has full composite fist with palm to pulp distance of 0.  No extensor lag rotational deformity.     Imaging/Test Results:  None today.      Assessment:  Right long finger crush injury/PIP sprain, improved after injection and therapy.     Plan:  Recommendations were made for patient continue with weightbearing activities as tolerated to the right upper extremity.  We discussed continued home exercise program for endurance and strengthening.  Return to work without restrictions.  Follow-up with our office in as-needed basis.  All questions answered at today's visit.    In a face to face encounter, I performed a history and physical examination, discussed pertinent diagnostic studies if indicated, and discussed diagnosis and management strategies with both the patient and the mid-level provider. I reviewed the mid-level's note and agree with the documented findings and plan of care.  Patient presents today for evaluation of the right long finger.  Complex sprain and crushing injury.  Making nice progress.  Full composite fist.  Full digital extension.  Still hypersensitive at the tip and some perceived tightness at the PIP joint but overall nice progress.  Okay for activities to tolerance and follow-up with me on an as-needed basis.

## 2025-05-19 NOTE — LETTER
May 19, 2025     Patient: John Turner   YOB: 1990   Date of Visit: 5/19/2025       To Whom It May Concern:    It is my medical opinion that John Turner may return to work on 05/20/2025 with no restrictions.    If you have any questions or concerns, please don't hesitate to call [phone].         Sincerely,        Harley Hutson, DO

## 2025-05-20 ENCOUNTER — APPOINTMENT (OUTPATIENT)
Dept: ORTHOPEDIC SURGERY | Facility: CLINIC | Age: 35
End: 2025-05-20
Payer: COMMERCIAL

## 2025-06-04 ENCOUNTER — APPOINTMENT (OUTPATIENT)
Dept: OCCUPATIONAL THERAPY | Facility: HOSPITAL | Age: 35
End: 2025-06-04
Payer: COMMERCIAL

## 2025-06-11 ENCOUNTER — APPOINTMENT (OUTPATIENT)
Dept: OCCUPATIONAL THERAPY | Facility: HOSPITAL | Age: 35
End: 2025-06-11
Payer: COMMERCIAL

## 2025-06-18 ENCOUNTER — APPOINTMENT (OUTPATIENT)
Dept: OCCUPATIONAL THERAPY | Facility: HOSPITAL | Age: 35
End: 2025-06-18
Payer: COMMERCIAL

## 2025-06-25 ENCOUNTER — APPOINTMENT (OUTPATIENT)
Dept: OCCUPATIONAL THERAPY | Facility: HOSPITAL | Age: 35
End: 2025-06-25
Payer: COMMERCIAL

## 2025-07-02 ENCOUNTER — APPOINTMENT (OUTPATIENT)
Dept: OCCUPATIONAL THERAPY | Facility: HOSPITAL | Age: 35
End: 2025-07-02
Payer: COMMERCIAL